# Patient Record
Sex: FEMALE | Race: WHITE | NOT HISPANIC OR LATINO | URBAN - METROPOLITAN AREA
[De-identification: names, ages, dates, MRNs, and addresses within clinical notes are randomized per-mention and may not be internally consistent; named-entity substitution may affect disease eponyms.]

---

## 2023-03-17 PROBLEM — J34.2 DEVIATED NASAL SEPTUM: Status: ACTIVE | Noted: 2023-03-17

## 2023-03-17 PROBLEM — R31.9 HEMATURIA: Status: ACTIVE | Noted: 2023-03-17

## 2023-03-17 PROBLEM — M79.671 RIGHT FOOT PAIN: Status: ACTIVE | Noted: 2023-03-17

## 2023-03-17 PROBLEM — E55.9 VITAMIN D DEFICIENCY: Status: ACTIVE | Noted: 2023-03-17

## 2023-03-17 PROBLEM — R35.0 URINARY FREQUENCY: Status: ACTIVE | Noted: 2023-03-17

## 2023-03-17 PROBLEM — E11.9 DIABETES MELLITUS, TYPE 2 (MULTI): Status: ACTIVE | Noted: 2023-03-17

## 2023-03-17 PROBLEM — R19.7 DIARRHEA: Status: RESOLVED | Noted: 2023-03-17 | Resolved: 2023-03-17

## 2023-03-17 PROBLEM — H92.01 OTALGIA OF RIGHT EAR: Status: ACTIVE | Noted: 2023-03-17

## 2023-03-17 PROBLEM — L29.9 PRURITUS: Status: ACTIVE | Noted: 2023-03-17

## 2023-03-17 PROBLEM — G89.29 CHRONIC NECK PAIN: Status: ACTIVE | Noted: 2023-03-17

## 2023-03-17 PROBLEM — R60.0 EDEMA OF BOTH LEGS: Status: ACTIVE | Noted: 2023-03-17

## 2023-03-17 PROBLEM — M06.9 RHEUMATOID ARTHRITIS (MULTI): Status: ACTIVE | Noted: 2023-03-17

## 2023-03-17 PROBLEM — B37.31 VAGINITIS DUE TO CANDIDA ALBICANS: Status: RESOLVED | Noted: 2023-03-17 | Resolved: 2023-03-17

## 2023-03-17 PROBLEM — I49.1 PAC (PREMATURE ATRIAL CONTRACTION): Status: ACTIVE | Noted: 2023-03-17

## 2023-03-17 PROBLEM — M25.561 RIGHT KNEE PAIN: Status: ACTIVE | Noted: 2023-03-17

## 2023-03-17 PROBLEM — R10.9 LEFT FLANK PAIN: Status: ACTIVE | Noted: 2023-03-17

## 2023-03-17 PROBLEM — B02.9 SHINGLES RASH: Status: ACTIVE | Noted: 2023-03-17

## 2023-03-17 PROBLEM — L29.9 ITCHY SKIN: Status: ACTIVE | Noted: 2023-03-17

## 2023-03-17 PROBLEM — R73.9 HYPERGLYCEMIA: Status: ACTIVE | Noted: 2023-03-17

## 2023-03-17 PROBLEM — R43.0 LOSS OF SMELL: Status: RESOLVED | Noted: 2023-03-17 | Resolved: 2023-03-17

## 2023-03-17 PROBLEM — B37.31 VAGINITIS DUE TO CANDIDA ALBICANS: Status: ACTIVE | Noted: 2023-03-17

## 2023-03-17 PROBLEM — K58.9 IBS (IRRITABLE BOWEL SYNDROME): Status: ACTIVE | Noted: 2023-03-17

## 2023-03-17 PROBLEM — R10.9 RIGHT FLANK PAIN: Status: ACTIVE | Noted: 2023-03-17

## 2023-03-17 PROBLEM — S61.209A OPEN WOUND OF FINGER: Status: ACTIVE | Noted: 2023-03-17

## 2023-03-17 PROBLEM — G47.00 INSOMNIA: Status: ACTIVE | Noted: 2023-03-17

## 2023-03-17 PROBLEM — R31.9 HEMATURIA: Status: RESOLVED | Noted: 2023-03-17 | Resolved: 2023-03-17

## 2023-03-17 PROBLEM — M54.2 CHRONIC NECK PAIN: Status: ACTIVE | Noted: 2023-03-17

## 2023-03-17 PROBLEM — R44.8 FACIAL PRESSURE: Status: ACTIVE | Noted: 2023-03-17

## 2023-03-17 PROBLEM — K64.9 HEMORRHOIDS: Status: ACTIVE | Noted: 2023-03-17

## 2023-03-17 PROBLEM — R63.5 WEIGHT GAIN: Status: RESOLVED | Noted: 2023-03-17 | Resolved: 2023-03-17

## 2023-03-17 PROBLEM — L03.112 CELLULITIS OF AXILLA, LEFT: Status: ACTIVE | Noted: 2023-03-17

## 2023-03-17 PROBLEM — I10 BENIGN ESSENTIAL HYPERTENSION: Status: ACTIVE | Noted: 2023-03-17

## 2023-03-17 PROBLEM — E66.01 MORBID OBESITY WITH BMI OF 45.0-49.9, ADULT (MULTI): Status: ACTIVE | Noted: 2023-03-17

## 2023-03-17 PROBLEM — H91.93 BILATERAL HEARING LOSS: Status: ACTIVE | Noted: 2023-03-17

## 2023-03-17 PROBLEM — G43.909 MIGRAINE HEADACHE: Status: ACTIVE | Noted: 2023-03-17

## 2023-03-17 PROBLEM — E66.01 MORBID OBESITY WITH BMI OF 50.0-59.9, ADULT (MULTI): Status: ACTIVE | Noted: 2023-03-17

## 2023-03-17 PROBLEM — R60.9 EDEMA: Status: RESOLVED | Noted: 2023-03-17 | Resolved: 2023-03-17

## 2023-03-17 PROBLEM — R10.9 RIGHT FLANK PAIN: Status: RESOLVED | Noted: 2023-03-17 | Resolved: 2023-03-17

## 2023-03-17 PROBLEM — R94.31 ABNORMAL EKG: Status: ACTIVE | Noted: 2023-03-17

## 2023-03-17 PROBLEM — R43.8 DECREASED SENSE OF SMELL: Status: ACTIVE | Noted: 2023-03-17

## 2023-03-17 PROBLEM — R03.0 ELEVATED BLOOD PRESSURE READING: Status: ACTIVE | Noted: 2023-03-17

## 2023-03-17 PROBLEM — M54.50 LOW BACK PAIN: Status: ACTIVE | Noted: 2023-03-17

## 2023-03-17 PROBLEM — R00.0 SINUS TACHYCARDIA: Status: ACTIVE | Noted: 2023-03-17

## 2023-03-17 PROBLEM — R35.0 URINARY FREQUENCY: Status: RESOLVED | Noted: 2023-03-17 | Resolved: 2023-03-17

## 2023-03-17 PROBLEM — H92.01 OTALGIA OF RIGHT EAR: Status: RESOLVED | Noted: 2023-03-17 | Resolved: 2023-03-17

## 2023-03-17 PROBLEM — L01.00 IMPETIGO: Status: ACTIVE | Noted: 2023-03-17

## 2023-03-17 PROBLEM — R60.9 EDEMA: Status: ACTIVE | Noted: 2023-03-17

## 2023-03-17 PROBLEM — F41.9 ANXIETY: Status: ACTIVE | Noted: 2023-03-17

## 2023-03-17 PROBLEM — R09.82 POSTNASAL DRIP: Status: ACTIVE | Noted: 2023-03-17

## 2023-03-17 PROBLEM — R43.2 LOSS OF TASTE: Status: ACTIVE | Noted: 2023-03-17

## 2023-03-17 PROBLEM — R19.7 DIARRHEA: Status: ACTIVE | Noted: 2023-03-17

## 2023-03-17 PROBLEM — R30.0 DYSURIA: Status: ACTIVE | Noted: 2023-03-17

## 2023-03-17 PROBLEM — N20.1 URETERAL CALCULUS, LEFT: Status: ACTIVE | Noted: 2023-03-17

## 2023-03-17 PROBLEM — E78.5 HYPERLIPIDEMIA: Status: ACTIVE | Noted: 2023-03-17

## 2023-03-17 PROBLEM — M45.9 ANKYLOSING SPONDYLITIS (MULTI): Status: ACTIVE | Noted: 2023-03-17

## 2023-03-17 PROBLEM — M25.551 RIGHT HIP PAIN: Status: ACTIVE | Noted: 2023-03-17

## 2023-03-17 PROBLEM — R43.0 LOSS OF SMELL: Status: ACTIVE | Noted: 2023-03-17

## 2023-03-17 PROBLEM — R63.5 WEIGHT GAIN: Status: ACTIVE | Noted: 2023-03-17

## 2023-03-17 PROBLEM — L81.9 PIGMENTED SKIN LESION: Status: ACTIVE | Noted: 2023-03-17

## 2023-03-17 PROBLEM — E78.00 HYPERCHOLESTEROLEMIA: Status: ACTIVE | Noted: 2023-03-17

## 2023-03-17 PROBLEM — R79.89 ELEVATED TROPONIN LEVEL NOT DUE MYOCARDIAL INFARCTION: Status: ACTIVE | Noted: 2023-03-17

## 2023-03-17 PROBLEM — K21.9 ESOPHAGEAL REFLUX: Status: ACTIVE | Noted: 2023-03-17

## 2023-03-17 PROBLEM — M54.6 THORACIC BACK PAIN: Status: ACTIVE | Noted: 2023-03-17

## 2023-03-17 PROBLEM — R44.8 FACIAL PRESSURE: Status: RESOLVED | Noted: 2023-03-17 | Resolved: 2023-03-17

## 2023-03-17 PROBLEM — S61.209A OPEN WOUND OF FINGER: Status: RESOLVED | Noted: 2023-03-17 | Resolved: 2023-03-17

## 2023-03-17 RX ORDER — OMEPRAZOLE 40 MG/1
CAPSULE, DELAYED RELEASE ORAL
COMMUNITY
End: 2023-03-21 | Stop reason: SDUPTHER

## 2023-03-17 RX ORDER — MULTIVITAMIN
TABLET ORAL
COMMUNITY

## 2023-03-17 RX ORDER — FUROSEMIDE 40 MG/1
40 TABLET ORAL DAILY
COMMUNITY
End: 2023-08-22 | Stop reason: SDUPTHER

## 2023-03-17 RX ORDER — CELECOXIB 200 MG/1
1 CAPSULE ORAL 2 TIMES DAILY
COMMUNITY

## 2023-03-17 RX ORDER — DICYCLOMINE HYDROCHLORIDE 10 MG/1
1 CAPSULE ORAL 2 TIMES DAILY
COMMUNITY
End: 2023-03-21 | Stop reason: SDUPTHER

## 2023-03-17 RX ORDER — FOLIC ACID 1 MG/1
TABLET ORAL
COMMUNITY

## 2023-03-21 ENCOUNTER — OFFICE VISIT (OUTPATIENT)
Dept: PRIMARY CARE | Facility: CLINIC | Age: 58
End: 2023-03-21
Payer: COMMERCIAL

## 2023-03-21 ENCOUNTER — LAB (OUTPATIENT)
Dept: LAB | Facility: LAB | Age: 58
End: 2023-03-21
Payer: COMMERCIAL

## 2023-03-21 VITALS
WEIGHT: 256 LBS | HEART RATE: 89 BPM | TEMPERATURE: 98 F | SYSTOLIC BLOOD PRESSURE: 139 MMHG | RESPIRATION RATE: 20 BRPM | OXYGEN SATURATION: 95 % | DIASTOLIC BLOOD PRESSURE: 42 MMHG | BODY MASS INDEX: 48.37 KG/M2

## 2023-03-21 DIAGNOSIS — R25.2 BILATERAL LEG CRAMPS: ICD-10-CM

## 2023-03-21 DIAGNOSIS — E55.9 VITAMIN D DEFICIENCY: ICD-10-CM

## 2023-03-21 DIAGNOSIS — K58.9 IRRITABLE BOWEL SYNDROME, UNSPECIFIED TYPE: Primary | ICD-10-CM

## 2023-03-21 DIAGNOSIS — K21.9 GASTROESOPHAGEAL REFLUX DISEASE WITHOUT ESOPHAGITIS: ICD-10-CM

## 2023-03-21 DIAGNOSIS — E53.8 VITAMIN B12 DEFICIENCY: ICD-10-CM

## 2023-03-21 DIAGNOSIS — Z83.3 FAMILY HISTORY OF DIABETES MELLITUS (DM): ICD-10-CM

## 2023-03-21 DIAGNOSIS — U07.1 COVID-19 VIRUS INFECTION: ICD-10-CM

## 2023-03-21 DIAGNOSIS — E66.01 MORBID OBESITY WITH BMI OF 45.0-49.9, ADULT (MULTI): ICD-10-CM

## 2023-03-21 DIAGNOSIS — K58.9 IRRITABLE BOWEL SYNDROME, UNSPECIFIED TYPE: ICD-10-CM

## 2023-03-21 LAB
ALANINE AMINOTRANSFERASE (SGPT) (U/L) IN SER/PLAS: 18 U/L (ref 7–45)
ALBUMIN (G/DL) IN SER/PLAS: 3.8 G/DL (ref 3.4–5)
ALKALINE PHOSPHATASE (U/L) IN SER/PLAS: 67 U/L (ref 33–110)
ANION GAP IN SER/PLAS: 13 MMOL/L (ref 10–20)
ASPARTATE AMINOTRANSFERASE (SGOT) (U/L) IN SER/PLAS: 16 U/L (ref 9–39)
BILIRUBIN TOTAL (MG/DL) IN SER/PLAS: 0.3 MG/DL (ref 0–1.2)
CALCIDIOL (25 OH VITAMIN D3) (NG/ML) IN SER/PLAS: 13 NG/ML
CALCIUM (MG/DL) IN SER/PLAS: 9.3 MG/DL (ref 8.6–10.3)
CARBON DIOXIDE, TOTAL (MMOL/L) IN SER/PLAS: 30 MMOL/L (ref 21–32)
CHLORIDE (MMOL/L) IN SER/PLAS: 100 MMOL/L (ref 98–107)
COBALAMIN (VITAMIN B12) (PG/ML) IN SER/PLAS: 654 PG/ML (ref 211–911)
CREATININE (MG/DL) IN SER/PLAS: 0.62 MG/DL (ref 0.5–1.05)
GFR FEMALE: >90 ML/MIN/1.73M2
GLUCOSE (MG/DL) IN SER/PLAS: 129 MG/DL (ref 74–99)
POTASSIUM (MMOL/L) IN SER/PLAS: 4.3 MMOL/L (ref 3.5–5.3)
PROTEIN TOTAL: 6.6 G/DL (ref 6.4–8.2)
SODIUM (MMOL/L) IN SER/PLAS: 139 MMOL/L (ref 136–145)
THYROTROPIN (MIU/L) IN SER/PLAS BY DETECTION LIMIT <= 0.05 MIU/L: 1.15 MIU/L (ref 0.44–3.98)
UREA NITROGEN (MG/DL) IN SER/PLAS: 18 MG/DL (ref 6–23)

## 2023-03-21 PROCEDURE — 82607 VITAMIN B-12: CPT

## 2023-03-21 PROCEDURE — 99214 OFFICE O/P EST MOD 30 MIN: CPT | Performed by: FAMILY MEDICINE

## 2023-03-21 PROCEDURE — 82306 VITAMIN D 25 HYDROXY: CPT

## 2023-03-21 PROCEDURE — 83036 HEMOGLOBIN GLYCOSYLATED A1C: CPT

## 2023-03-21 PROCEDURE — 80053 COMPREHEN METABOLIC PANEL: CPT

## 2023-03-21 PROCEDURE — 36415 COLL VENOUS BLD VENIPUNCTURE: CPT

## 2023-03-21 PROCEDURE — 84443 ASSAY THYROID STIM HORMONE: CPT

## 2023-03-21 RX ORDER — DICYCLOMINE HYDROCHLORIDE 10 MG/1
10 CAPSULE ORAL 2 TIMES DAILY
Qty: 180 CAPSULE | Refills: 1 | Status: SHIPPED | OUTPATIENT
Start: 2023-03-21 | End: 2023-09-17

## 2023-03-21 RX ORDER — OMEPRAZOLE 40 MG/1
40 CAPSULE, DELAYED RELEASE ORAL DAILY
Qty: 90 CAPSULE | Refills: 1 | Status: SHIPPED | OUTPATIENT
Start: 2023-03-21 | End: 2023-09-17

## 2023-03-21 NOTE — PROGRESS NOTES
Subjective   Patient ID: Bren Carson is a 58 y.o. female who presents for COVID FU, Irritable Bowel Syndrome (Needing dycyclomine or something else.), Skin Problem (Has been using a steroid cream for inflamed rash on her back and legs. Trying to determine if it is cellulitis or water. ), and Med Refill.    HPI   Patient comes in today for follow-up on COVID infection and IBS.  She was diagnosed with COVID last Tuesday and had a virtual visit with her insurance and was given Paxlovid.  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    Her IBS has been acting up.  She has been having loose bowel movements about 3 times a day.  She states she has run out of her dicyclomine which has been working pretty well for her.  She has been taking 10 mg twice a day.    Patient also complaining about leg cramps.  She states he gets them especially at night.  She does not want to take muscle relaxers for them.  She wants to know if she should take potassium.  I recommended we recheck her potassium level today.      Review of Systems   All other systems reviewed and are negative.      Objective   BP (!) 139/42   Pulse 89   Temp 36.7 °C (98 °F)   Resp 20   Wt 116 kg (256 lb)   LMP  (LMP Unknown)   SpO2 95%   BMI 48.37 kg/m²     Physical Exam  Vitals reviewed.   Constitutional:       Appearance: She is well-developed. She is obese.      Comments: Morbidly obese  female   HENT:      Head: Normocephalic and atraumatic.      Right Ear: Tympanic membrane, ear canal and external ear normal.      Left Ear: Tympanic membrane, ear canal and external ear normal.      Nose: Nose normal.      Mouth/Throat:      Mouth: Mucous membranes are moist.      Pharynx: Oropharynx is clear.   Eyes:      Extraocular Movements: Extraocular movements intact.      Conjunctiva/sclera: Conjunctivae normal.      Pupils: Pupils are equal, round, and reactive to light.   Cardiovascular:      Rate and Rhythm: Normal rate and regular rhythm.       Heart sounds: Normal heart sounds. No murmur heard.  Pulmonary:      Effort: Pulmonary effort is normal.      Breath sounds: Normal breath sounds. No wheezing.   Abdominal:      General: Abdomen is flat. Bowel sounds are normal.      Palpations: Abdomen is soft.   Musculoskeletal:         General: Normal range of motion.   Skin:     General: Skin is warm and dry.   Neurological:      General: No focal deficit present.      Mental Status: She is alert and oriented to person, place, and time. Mental status is at baseline.   Psychiatric:         Mood and Affect: Mood normal.         Behavior: Behavior normal.         Thought Content: Thought content normal.         Judgment: Judgment normal.         Assessment/Plan   Problem List Items Addressed This Visit       Esophageal reflux    Relevant Medications    dicyclomine (Bentyl) 10 mg capsule    IBS (irritable bowel syndrome) - Primary    Relevant Medications    omeprazole (PriLOSEC) 40 mg DR capsule    Other Relevant Orders    Comprehensive Metabolic Panel (Completed)    TSH with reflex to Free T4 if abnormal (Completed)    Vitamin D deficiency    Relevant Orders    Vitamin D, Total (Completed)    Morbid obesity with BMI of 45.0-49.9, adult (CMS/[facility])    COVID-19 virus infection    Family history of diabetes mellitus (DM)    Relevant Orders    Hemoglobin A1C (Completed)    Vitamin B12 deficiency    Relevant Orders    Vitamin B12 (Completed)    Bilateral leg cramps    Relevant Orders    Comprehensive Metabolic Panel (Completed)    TSH with reflex to Free T4 if abnormal (Completed)   Will contact patient with lab results when available.  Continue current meds as directed.  Follow up in 6 months for recheck if all remains stable, sooner if problems arise.  Medication list reconciled.  Thank you for visiting today!      Professional services: Some of this note was completed using Dragon voice technology and sometimes the software misinterprets words. This may include  unintended errors with respect to translation of words, typographical errors or grammar errors which may not have been identified prior to finalization of the chart note. Please take this into account when reading the note. Thank you.

## 2023-03-22 PROBLEM — R25.2 BILATERAL LEG CRAMPS: Status: ACTIVE | Noted: 2023-03-22

## 2023-03-22 PROBLEM — U07.1 COVID-19 VIRUS INFECTION: Status: ACTIVE | Noted: 2023-03-22

## 2023-03-22 PROBLEM — E53.8 VITAMIN B12 DEFICIENCY: Status: ACTIVE | Noted: 2023-03-22

## 2023-03-22 PROBLEM — Z83.3 FAMILY HISTORY OF DIABETES MELLITUS (DM): Status: ACTIVE | Noted: 2023-03-22

## 2023-03-22 LAB
ESTIMATED AVERAGE GLUCOSE FOR HBA1C: 151 MG/DL
HEMOGLOBIN A1C/HEMOGLOBIN TOTAL IN BLOOD: 6.9 %

## 2023-03-24 ENCOUNTER — TELEPHONE (OUTPATIENT)
Dept: PRIMARY CARE | Facility: CLINIC | Age: 58
End: 2023-03-24
Payer: COMMERCIAL

## 2023-03-24 DIAGNOSIS — E55.9 VITAMIN D DEFICIENCY: Primary | ICD-10-CM

## 2023-03-24 RX ORDER — ERGOCALCIFEROL 1.25 MG/1
CAPSULE ORAL
Qty: 16 CAPSULE | Refills: 2 | Status: SHIPPED | OUTPATIENT
Start: 2023-03-24 | End: 2023-04-17

## 2023-03-24 NOTE — TELEPHONE ENCOUNTER
----- Message from Octavio Garcia DO sent at 3/24/2023  7:32 AM EDT -----  Regarding: Labs  Please notify patient of hemoglobin A1c of 6.9.  Watch sugar, carbs and starches in the diet and recheck in July 2023.    Very low vitamin D of 13.  It should be between 30 and 100 the closer to 50 the better. It is important vitamin for your heart, lungs, immune system and bones among other things in your body. Would recommend high potency vitamin D 50,000 units 4 times a week for the next 12 weeks and recheck in July 2023.  Prescription sent to pharmacy.    All the rest of the labs are good.  No reason for leg cramps as potassium and calcium levels are good.  ----- Message -----  From: Lab, Background User  Sent: 3/21/2023   8:23 PM EDT  To: Octavio Garcia DO

## 2023-04-15 DIAGNOSIS — E55.9 VITAMIN D DEFICIENCY: ICD-10-CM

## 2023-04-17 RX ORDER — ERGOCALCIFEROL 1.25 MG/1
CAPSULE ORAL
Qty: 16 CAPSULE | Refills: 2 | Status: SHIPPED | OUTPATIENT
Start: 2023-04-17 | End: 2024-01-22

## 2023-04-17 NOTE — TELEPHONE ENCOUNTER
Requested Prescriptions     Pending Prescriptions Disp Refills    ergocalciferol (Vitamin D-2) 1.25 MG (13634 UT) capsule [Pharmacy Med Name: VITAMIN D2 1.25MG(50,000 UNIT)] 16 capsule 2     Sig: TAKE 1 CAPSULE BY MOUTH 4X/WEEK FOR 12 WEEKS

## 2023-05-15 ENCOUNTER — OFFICE VISIT (OUTPATIENT)
Dept: PRIMARY CARE | Facility: CLINIC | Age: 58
End: 2023-05-15
Payer: COMMERCIAL

## 2023-05-15 VITALS
OXYGEN SATURATION: 94 % | SYSTOLIC BLOOD PRESSURE: 138 MMHG | RESPIRATION RATE: 18 BRPM | DIASTOLIC BLOOD PRESSURE: 75 MMHG | TEMPERATURE: 97.2 F | HEART RATE: 84 BPM

## 2023-05-15 DIAGNOSIS — J20.9 ACUTE BRONCHITIS, UNSPECIFIED ORGANISM: Primary | ICD-10-CM

## 2023-05-15 DIAGNOSIS — E66.01 MORBID OBESITY WITH BMI OF 45.0-49.9, ADULT (MULTI): ICD-10-CM

## 2023-05-15 PROCEDURE — 99213 OFFICE O/P EST LOW 20 MIN: CPT | Performed by: FAMILY MEDICINE

## 2023-05-15 RX ORDER — DOXYCYCLINE 100 MG/1
100 CAPSULE ORAL 2 TIMES DAILY
Qty: 20 CAPSULE | Refills: 0 | Status: SHIPPED | OUTPATIENT
Start: 2023-05-15

## 2023-05-15 RX ORDER — BENZONATATE 200 MG/1
200 CAPSULE ORAL 3 TIMES DAILY PRN
Qty: 30 CAPSULE | Refills: 0 | Status: SHIPPED | OUTPATIENT
Start: 2023-05-15 | End: 2023-05-25

## 2023-05-15 NOTE — LETTER
May 15, 2023     Patient: Bren Carson   YOB: 1965   Date of Visit: 5/15/2023       To Whom It May Concern:    Bren Carson was seen in my clinic on 5/15/2023 at 9:30 am. Please excuse Bren for her absence from work on this day to make the appointment.    If you have any questions or concerns, please don't hesitate to call.         Sincerely,         Octavio Garcia,         CC: No Recipients

## 2023-05-15 NOTE — PROGRESS NOTES
Subjective   Patient ID: Bren Carson is a 58 y.o. female who presents for Cough (Sore throat, productive- grayish. ), Nasal Congestion (Started wednesday), and Chills (Last took tylenol around 430 am).    HPI   Patient comes in today for evaluation of upper respiratory symptoms.  She started last Tuesday with a sore throat and on Wednesday developed congestion.  It gradually got worse over the next several days and through the weekend.  She has been running a low-grade temperature and her arthritis has been flaring up.  She has had a productive cough with gray sputum and nasal drainage which has been green in color.  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    3/21/2023  Patient comes in today for follow-up on COVID infection and IBS.  She was diagnosed with COVID last Tuesday and had a virtual visit with her insurance and was given Paxlovid.  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    Her IBS has been acting up.  She has been having loose bowel movements about 3 times a day.  She states she has run out of her dicyclomine which has been working pretty well for her.  She has been taking 10 mg twice a day.    Patient also complaining about leg cramps.  She states he gets them especially at night.  She does not want to take muscle relaxers for them.  She wants to know if she should take potassium.  I recommended we recheck her potassium level today.        2/6/2023  Patient comes in today complaining of neck and back pain which started last monday, did not have an injury. She states that she woke up with the pain. She states she has been out of Celebrex for 3 or 4 days but now is back on it and her discomfort is getting better. She describes the pain today as a 4 out of 10. She does see rheumatology, Dr. Quiñones, and he is supposed to be starting her on a trial of new medication. She has an appointment later this month. Nothing however seems to be helping much.    11/30/2022  Patient  comes in today for follow-up on the edema of both of her lower extremities. She has been through a lot in the last few weeks. Her mother  on Saturday, 2022. She was experiencing some leg edema and had a visit with a telemetry doc who thought she may have cellulitis and placed her on Keflex. The Keflex did not help the edema.    She then came in for an office visit here and was evaluated and thought to be in atrial fibrillation. Because it was new onset she was sent to the ER via squad. After thorough evaluation in the ER she was released to home with no recommended treatment for the edema. She had a follow-up appointment with cardiology who apparently questioned her on why she did not start taking Lasix which she had at home. Patient claims she has 3 different medical personnel in the ER about taking the Lasix and they all instructed her to wait until she was seen here. As of today she has been taking Lasix for the last 2 days. The swelling is starting to go down and she has lost 3 pounds.    Patient also complaining of body itching she claims that it is all over her body. She has no visible rash.    2022   Patient comes in today for evaluation of moles on her back. Patient has a number of pigmented nevi of various sizes all over her back. There are none that appear to be worrisome at this time. I have recommended that we continue to follow and recheck in 6 months. Patient continues to take his dicyclomine for her bowels but has cut it down to 2 pills a day rather than 4.    Patient realizes that she needs to do something about her weight. 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    11/3/2021  Patient is seen today as a telemedicine visit rendered via realtime interactive audio/video [employer] as we are currently in the middle of a coronavirus pandemic worldwide. The patient was informed about the telehealth clinical encounter including benefits to avoiding  "travel and limitations to the assessment. In person care may be recommended if needed. Telehealth sessions are not being recorded and personal health information is protected.    Patient presents today with upper respiratory symptoms. She has sinus congestion and drainage, left ear pain, cough and some chest congestion as well. She denies fever or sore throat. Patient is a teacher. She denies any Covid in her classroom. She has had the Covid vaccine but not the booster.    9/21/2021  Patient comes in today complaining of back pain. It starts beneath her left scapula and comes down her back and curls around to the front of the abdomen. She states that it began a little on Sunday and more so yesterday on Monday. She states she was doing prep work on Saturday for a big picnic on Sunday. It required a lot of repetitive work but not heavy lifting. Patient states she is not really had any IBS symptoms the last few days and denies blood or mucus in the stool. She did have some shrimp and a few peanuts over the weekend but otherwise nothing unusual to eat. She denies blood in her urine.    2/9/2021  Patient comes in today for reevaluation of her low back pain. She has been here before for this. 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 As noted previously she is having to teach both at the same time. She finds her self sitting in the chair in the classroom and twisting and turning her body in different directions so she can see both the computer screen and the classroom. She is morbidly obese and has gained more weight and is up to 268 pounds now. She has gained 6 more pounds since October. She states that she gets \"knots\" in her back and she has used dry needling in the past and it really seems to work. She would like to try it again. The pain does not go down her legs but it does limit her range of motion.    Patient is also overdue for labs and bone density study. She has " "never had a bone density before and is approximately 4 years postmenopausal.    Being a teacher patient is scheduled to get the coronavirus vaccine next week and has been told by her rheumatologist, Dr. Quiñones to get off of the methotrexate for 2 weeks following the vaccine. She is miserable when she does not take the methotrexate and wonders what she can do. Unfortunately with the other medicines she is on all she can do is add Tylenol 500 mg 2 pills p.o. 3 times daily as needed. She states Tylenol does not really work for her.    10/30/2020  Patient comes in today for physical exam. She is currently without complaints. Her recent lab work shows her lipid panel and cholesterol to be high, her hemoglobin A1c was 6.7 and vitamin D was low. She will work on correcting all those. She knows she needs to lose weight. It is very stressful for her right now as her  has lost his job and is looking for work and she is a teacher and is trying to manage virtual and in person classes.     10/8/2020  Patient comes in today for evaluation of low back pain. 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 She is having to teach both at the same time. She finds herself sitting in the chair in the classroom and last Thursday when she got up she could hardly walk. Patient is morbidly obese at 5 foot 1, 261 pounds.    4/29/2020  Patient is seen today as a telemedicine visit rendered via realtime interactive audio/video [email or web address] services as we are currently in the middle of a coronavirus pandemic worldwide with stay-at-home orders by the government. Bren presents today describing \"major pain in my lower back\". She has had this before with bladder infections most recently back in February 2020. She believes she has an infection now today. She has gained some weight during this stay at home time for the coronavirus pandemic but doesn't feeling that has anything to do with this pain she is " "having. He is also having frequency and urgency with urination.    2/5/2020  Patient comes in today complaining of right lower back pain. It all began last night with sharp pain in the right flank and lower back. She denies trauma or injury to the back. She is morbidly obese. UA today is positive for trace amount of blood.    11/27/19  Patient comes in today with her daughter who is also sick complaining of swelling of her right eye which began acutely yesterday. It became worse last evening and through the night and this morning it's now spread to the left eye. She has also a sore throat, sinus congestion and drainage and left ear pain.    2/11/2019   The patient is a 53 year old female who presents for a medication evaluation. The patient feels well with minor complaints (Pt is here for a follow up on her meds she also complains of a possible cyst on the right side of her neck). Note for \"Medication Evaluation\": Patient comes in today to review a variety of issues.  She first was here for complaints of some swelling on the right side of her neck.  Patient is morbidly obese.  She noticed the swelling in the past week.    For her irritable bowel syndrome patient has been using dicyclomine 2 times a day successfully until now.  Lately however she's been having some breakthrough issues and would like to increase it to 3 times a day.  It was originally prescribed 4 times a day    Patient continues to notice the shaking in her left fifth finger which appears to be a resting tremor.  She is left-hand dominant.  She has also noticed that she's been getting more forgetful and it appears to be more short-term memory than long-term memory.  She also has had a loss of smell and is on her second ENT specialist for evaluation of that.  So far they have not come up with a solution.  He does have her scheduled for an MRI coming up.  She has an appointment with neurology but not until next month.    11/27/17  Patient comes in " today complaining of low back pain. She claims the pain travels into her right hip. She has also had some pain in her neck especially with left side bending. There is no known trauma for either location. She has had the low back pain before but hasn't had an x-ray in quite some time.    She also continues to complain that she can't taste or smell anything. She has seen a specialist about it but they did not offer any help.     Patient is a schoolteacher.     She had also noticed that she has to have a bowel movement within an hour after eating. That is much improved now with the probiotics.     Review of Systems   All other systems reviewed and are negative.      Objective   /75   Pulse 84   Temp 36.2 °C (97.2 °F)   Resp 18   LMP  (LMP Unknown)   SpO2 94%     Physical Exam  Vitals reviewed.   Constitutional:       Appearance: She is well-developed.      Comments: Morbidly obese 58-year-old  female   HENT:      Head: Normocephalic and atraumatic.      Right Ear: Tympanic membrane, ear canal and external ear normal.      Left Ear: Tympanic membrane, ear canal and external ear normal.      Nose: Nose normal.      Mouth/Throat:      Mouth: Mucous membranes are moist.      Pharynx: Oropharynx is clear.   Eyes:      Extraocular Movements: Extraocular movements intact.      Conjunctiva/sclera: Conjunctivae normal.      Pupils: Pupils are equal, round, and reactive to light.   Cardiovascular:      Rate and Rhythm: Normal rate and regular rhythm.      Heart sounds: Normal heart sounds. No murmur heard.  Pulmonary:      Effort: Pulmonary effort is normal.      Breath sounds: Normal breath sounds. No wheezing.   Abdominal:      General: Abdomen is flat. Bowel sounds are normal.      Palpations: Abdomen is soft.   Musculoskeletal:         General: Normal range of motion.   Skin:     General: Skin is warm and dry.   Neurological:      General: No focal deficit present.      Mental Status: She is alert and  oriented to person, place, and time. Mental status is at baseline.   Psychiatric:         Mood and Affect: Mood normal.         Behavior: Behavior normal.         Thought Content: Thought content normal.         Judgment: Judgment normal.         Assessment/Plan   Problem List Items Addressed This Visit       Morbid obesity with BMI of 45.0-49.9, adult (CMS/HCC)    Acute bronchitis - Primary    Relevant Medications    doxycycline (Monodox) 100 mg capsule    benzonatate (Tessalon) 200 mg capsule   Use meds as directed.  Return to clinic if symptoms do not improve in 10-14 days.    Should the condition worsen contact me and return here or if after hours seek further evaluation at an urgent care or in the emergency room.  How of the work slip until Wednesday, 5/17/2023.  Medication list reconciled.  Thank you for visiting today!      Professional services: Some of this note was completed using Dragon voice technology and sometimes the software misinterprets words. This may include unintended errors with respect to translation of words, typographical errors or grammar errors which may not have been identified prior to finalization of the chart note. Please take this into account when reading the note. Thank you.

## 2023-08-22 ENCOUNTER — OFFICE VISIT (OUTPATIENT)
Dept: PRIMARY CARE | Facility: CLINIC | Age: 58
End: 2023-08-22
Payer: COMMERCIAL

## 2023-08-22 VITALS
DIASTOLIC BLOOD PRESSURE: 89 MMHG | WEIGHT: 263 LBS | RESPIRATION RATE: 20 BRPM | HEART RATE: 105 BPM | TEMPERATURE: 97.3 F | SYSTOLIC BLOOD PRESSURE: 123 MMHG | BODY MASS INDEX: 49.69 KG/M2

## 2023-08-22 DIAGNOSIS — R60.0 EDEMA OF BOTH LEGS: ICD-10-CM

## 2023-08-22 DIAGNOSIS — E11.9 TYPE 2 DIABETES MELLITUS WITHOUT COMPLICATION, WITHOUT LONG-TERM CURRENT USE OF INSULIN (MULTI): ICD-10-CM

## 2023-08-22 DIAGNOSIS — M45.0 ANKYLOSING SPONDYLITIS OF MULTIPLE SITES IN SPINE (MULTI): Primary | ICD-10-CM

## 2023-08-22 DIAGNOSIS — E55.9 VITAMIN D DEFICIENCY: ICD-10-CM

## 2023-08-22 DIAGNOSIS — E66.01 MORBID OBESITY WITH BMI OF 45.0-49.9, ADULT (MULTI): ICD-10-CM

## 2023-08-22 PROCEDURE — 99214 OFFICE O/P EST MOD 30 MIN: CPT | Performed by: FAMILY MEDICINE

## 2023-08-22 RX ORDER — FUROSEMIDE 40 MG/1
40 TABLET ORAL DAILY
Qty: 30 TABLET | Refills: 1 | Status: SHIPPED | OUTPATIENT
Start: 2023-08-22 | End: 2024-01-22

## 2023-08-22 RX ORDER — PREDNISONE 20 MG/1
TABLET ORAL
Qty: 30 TABLET | Refills: 0 | Status: SHIPPED | OUTPATIENT
Start: 2023-08-22

## 2023-08-22 RX ORDER — HYDROXYCHLOROQUINE SULFATE 200 MG/1
200 TABLET, FILM COATED ORAL 2 TIMES DAILY
COMMUNITY
Start: 2023-06-20

## 2023-08-22 NOTE — PROGRESS NOTES
Subjective   Patient ID: Bren Carson is a 58 y.o. female who presents for Med Refill (States she hasn't taken the dycylomine in a while. Did start plaquenil in June and not soon after has not felt way. Sob, muscles are taught, can't get comfortable to sleep. ).    HPI  Patient comes in today complaining of significant back pain and muscle aches.  She states she cannot sit or stand or lay down for too long before her back hurts significantly and she has to change positions.  She currently is standing in the exam room leaning over the exam table.  She claims her back and legs hurt.  She has recently changed rheumatologist and is now seeing Dr. Tomas at [facility] who has placed her on Plaquenil 200 mg twice daily along with methotrexate injections.  She is morbidly obese.  She is taking Celebrex 200 mg twice daily as well.  She does not have an appointment with Dr. Tomas until October.  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    Review of Systems   All other systems reviewed and are negative.      Objective   /89   Pulse 105   Temp 36.3 °C (97.3 °F)   Resp 20   Wt 119 kg (263 lb)   LMP  (LMP Unknown)   BMI 49.69 kg/m²     Physical Exam  Vitals reviewed.   Constitutional:       Appearance: She is morbidly obese.      Comments: Morbidly obese 58-year-old  female   HENT:      Head: Normocephalic and atraumatic.      Right Ear: Tympanic membrane, ear canal and external ear normal.      Left Ear: Tympanic membrane, ear canal and external ear normal.      Nose: Nose normal.      Mouth/Throat:      Mouth: Mucous membranes are moist.      Pharynx: Oropharynx is clear.   Eyes:      Extraocular Movements: Extraocular movements intact.      Conjunctiva/sclera: Conjunctivae normal.      Pupils: Pupils are equal, round, and reactive to light.   Cardiovascular:      Rate and Rhythm: Normal rate and regular rhythm.      Heart sounds: Normal heart sounds. No murmur heard.  Pulmonary:       Effort: Pulmonary effort is normal.      Breath sounds: Normal breath sounds. No wheezing.   Abdominal:      General: Abdomen is flat. Bowel sounds are normal.      Palpations: Abdomen is soft.   Musculoskeletal:         General: Normal range of motion.   Skin:     General: Skin is warm and dry.   Neurological:      General: No focal deficit present.      Mental Status: She is alert and oriented to person, place, and time. Mental status is at baseline.   Psychiatric:         Mood and Affect: Mood normal.         Behavior: Behavior normal.         Thought Content: Thought content normal.         Judgment: Judgment normal.         Assessment/Plan   Problem List Items Addressed This Visit       Diabetes mellitus, type 2 (CMS/[facility])    Relevant Orders    Hemoglobin A1C    Comprehensive Metabolic Panel    Edema of both legs    Relevant Medications    furosemide (Lasix) 40 mg tablet    Other Relevant Orders    Comprehensive Metabolic Panel    Vitamin D deficiency    Relevant Orders    Vitamin D, Total    Ankylosing spondylitis (CMS/[facility]) - Primary    Relevant Medications    predniSONE (Deltasone) 20 mg tablet    Morbid obesity with BMI of 45.0-49.9, adult (CMS/[facility])   Will contact patient with lab results when available.  Take new medication as directed.  Continue other medications as directed.  RTC in one month for recheck, sooner should problems arise.  Medication list reconciled.  Thank you for visiting today!      Professional services: Some of this note was completed using Dragon voice technology and sometimes the software misinterprets words. This may include unintended errors with respect to translation of words, typographical errors or grammar errors which may not have been identified prior to finalization of the chart note. Please take this into account when reading the note. Thank you.

## 2023-12-11 ENCOUNTER — LAB (OUTPATIENT)
Dept: LAB | Facility: LAB | Age: 58
End: 2023-12-11
Payer: COMMERCIAL

## 2023-12-11 ENCOUNTER — OFFICE VISIT (OUTPATIENT)
Dept: PRIMARY CARE | Facility: CLINIC | Age: 58
End: 2023-12-11
Payer: COMMERCIAL

## 2023-12-11 VITALS
RESPIRATION RATE: 24 BRPM | HEART RATE: 105 BPM | OXYGEN SATURATION: 93 % | DIASTOLIC BLOOD PRESSURE: 87 MMHG | BODY MASS INDEX: 49.88 KG/M2 | WEIGHT: 264 LBS | SYSTOLIC BLOOD PRESSURE: 133 MMHG | TEMPERATURE: 98 F

## 2023-12-11 DIAGNOSIS — L03.115 CELLULITIS OF RIGHT LOWER EXTREMITY: Primary | ICD-10-CM

## 2023-12-11 DIAGNOSIS — E55.9 VITAMIN D DEFICIENCY: ICD-10-CM

## 2023-12-11 DIAGNOSIS — R60.0 EDEMA OF BOTH LEGS: ICD-10-CM

## 2023-12-11 DIAGNOSIS — E11.9 TYPE 2 DIABETES MELLITUS WITHOUT COMPLICATION, WITHOUT LONG-TERM CURRENT USE OF INSULIN (MULTI): ICD-10-CM

## 2023-12-11 LAB
25(OH)D3 SERPL-MCNC: 44 NG/ML (ref 30–100)
ALBUMIN SERPL BCP-MCNC: 3.9 G/DL (ref 3.4–5)
ALP SERPL-CCNC: 59 U/L (ref 33–110)
ALT SERPL W P-5'-P-CCNC: 22 U/L (ref 7–45)
ANION GAP SERPL CALC-SCNC: 13 MMOL/L (ref 10–20)
AST SERPL W P-5'-P-CCNC: 24 U/L (ref 9–39)
BILIRUB SERPL-MCNC: 1 MG/DL (ref 0–1.2)
BUN SERPL-MCNC: 15 MG/DL (ref 6–23)
CALCIUM SERPL-MCNC: 9 MG/DL (ref 8.6–10.3)
CHLORIDE SERPL-SCNC: 99 MMOL/L (ref 98–107)
CO2 SERPL-SCNC: 36 MMOL/L (ref 21–32)
CREAT SERPL-MCNC: 0.7 MG/DL (ref 0.5–1.05)
EST. AVERAGE GLUCOSE BLD GHB EST-MCNC: 154 MG/DL
GFR SERPL CREATININE-BSD FRML MDRD: >90 ML/MIN/1.73M*2
GLUCOSE SERPL-MCNC: 127 MG/DL (ref 74–99)
HBA1C MFR BLD: 7 %
POTASSIUM SERPL-SCNC: 3.7 MMOL/L (ref 3.5–5.3)
PROT SERPL-MCNC: 6.2 G/DL (ref 6.4–8.2)
SODIUM SERPL-SCNC: 144 MMOL/L (ref 136–145)

## 2023-12-11 PROCEDURE — 82306 VITAMIN D 25 HYDROXY: CPT

## 2023-12-11 PROCEDURE — 80053 COMPREHEN METABOLIC PANEL: CPT

## 2023-12-11 PROCEDURE — 99214 OFFICE O/P EST MOD 30 MIN: CPT | Performed by: FAMILY MEDICINE

## 2023-12-11 PROCEDURE — 36415 COLL VENOUS BLD VENIPUNCTURE: CPT

## 2023-12-11 PROCEDURE — 83036 HEMOGLOBIN GLYCOSYLATED A1C: CPT

## 2023-12-11 RX ORDER — METOLAZONE 5 MG/1
5 TABLET ORAL DAILY
Qty: 10 TABLET | Refills: 0 | Status: SHIPPED | OUTPATIENT
Start: 2023-12-11 | End: 2024-02-26 | Stop reason: ALTCHOICE

## 2023-12-11 RX ORDER — CEPHALEXIN 500 MG/1
500 CAPSULE ORAL 3 TIMES DAILY
Qty: 30 CAPSULE | Refills: 0 | Status: SHIPPED | OUTPATIENT
Start: 2023-12-11 | End: 2023-12-21

## 2023-12-11 NOTE — PROGRESS NOTES
"Subjective   Patient ID: Bren Carson is a 58 y.o. female who presents for Edema (Legs and belly, intermittent x 1 yr. She had been taking the lasix but her legs are not improving, the are painful and swollen and seeping. /Seeing vascular- told her to stay active. Doing vein ablation, 1st was last Thursday on R leg. ).    HPI   Patient comes in today with her  Diego complaining of significant painful edema in her extremities bilaterally right greater than left and in her lower abdomen.  She has been taking Lasix 40 mg daily until last week when she increase it to 80 mg but it does not seem to be helping.  She is very uncomfortable.  She has been getting ablation of her veins in both legs most recently this past Thursday and her next one is due 12/21/2023.  She has been instructed to have her  massage her legs in between ablation treatments but that process has proved to be extremely painful bringing her to tears.  Her legs have been \"leaking fluid\" especially on the right and she has a small pretibial shallow ulceration approximately 5 mm in diameter on the right.    8/22/2023  Patient comes in today complaining of significant back pain and muscle aches.  She states she cannot sit or stand or lay down for too long before her back hurts significantly and she has to change positions.  She currently is standing in the exam room leaning over the exam table.  She claims her back and legs hurt.  She has recently changed rheumatologist and is now seeing Dr. Tomas at [facility] who has placed her on Plaquenil 200 mg twice daily along with methotrexate injections.  She is morbidly obese.  She is taking Celebrex 200 mg twice daily as well.  She does not have an appointment with Dr. Tomas until October.  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    Review of Systems   All other systems reviewed and are negative.      Objective   /87   Pulse 105   Temp 36.7 °C (98 °F)   Resp 24   Wt 120 " kg (264 lb)   LMP  (LMP Unknown)   SpO2 93%   BMI 49.88 kg/m²     Physical Exam  Constitutional:       Appearance: She is well-developed. She is morbidly obese.       HENT:      Head: Normocephalic and atraumatic.      Right Ear: Tympanic membrane, ear canal and external ear normal.      Left Ear: Tympanic membrane, ear canal and external ear normal.      Nose: Nose normal.      Mouth/Throat:      Mouth: Mucous membranes are moist.      Pharynx: Oropharynx is clear.   Eyes:      Extraocular Movements: Extraocular movements intact.      Conjunctiva/sclera: Conjunctivae normal.      Pupils: Pupils are equal, round, and reactive to light.      Comments: Patient wears glasses   Cardiovascular:      Rate and Rhythm: Normal rate and regular rhythm.      Heart sounds: Normal heart sounds. No murmur heard.  Pulmonary:      Effort: Pulmonary effort is normal.      Breath sounds: Normal breath sounds. No wheezing.   Abdominal:      General: Abdomen is flat. Bowel sounds are normal.      Palpations: Abdomen is soft.   Musculoskeletal:         General: Normal range of motion.   Skin:     General: Skin is warm and dry.   Neurological:      General: No focal deficit present.      Mental Status: She is alert and oriented to person, place, and time. Mental status is at baseline.   Psychiatric:         Mood and Affect: Mood normal.         Behavior: Behavior normal.         Thought Content: Thought content normal.         Judgment: Judgment normal.         Assessment/Plan   Problem List Items Addressed This Visit             ICD-10-CM    Edema of both legs R60.0    Relevant Medications    metOLazone (Zaroxolyn) 5 mg tablet     Other Visit Diagnoses         Codes    Cellulitis of right lower extremity    -  Primary L03.115    Relevant Medications    cephalexin (Keflex) 500 mg capsule        Will contact patient with lab results when available.  Decrease Lasix to 40 mg daily.  Take new medications as directed.  Continue current  meds as directed.  Followup in one week for recheck if all remains stable, sooner if problems arise  Medication list reconciled.  Thank you for visiting today!      Professional services: Some of this note was completed using Dragon voice technology and sometimes the software misinterprets words. This may include unintended errors with respect to translation of words, typographical errors or grammar errors which may not have been identified prior to finalization of the chart note. Please take this into account when reading the note. Thank you.

## 2023-12-12 DIAGNOSIS — E87.6 HYPOKALEMIA: Primary | ICD-10-CM

## 2023-12-12 RX ORDER — POTASSIUM CHLORIDE 20 MEQ/1
20 TABLET, EXTENDED RELEASE ORAL DAILY
Qty: 10 TABLET | Refills: 0 | Status: SHIPPED | OUTPATIENT
Start: 2023-12-12 | End: 2023-12-18 | Stop reason: SDUPTHER

## 2023-12-14 ENCOUNTER — TELEPHONE (OUTPATIENT)
Dept: PRIMARY CARE | Facility: CLINIC | Age: 58
End: 2023-12-14
Payer: COMMERCIAL

## 2023-12-14 NOTE — TELEPHONE ENCOUNTER
I spoke with patient this evening and we discussed her leg swelling.  It is improved and she is urinating more with the new diuretic.  Will have her continue current treatment and follow-up next week as planned.  She is to contact me sooner if any problems arise.

## 2023-12-14 NOTE — TELEPHONE ENCOUNTER
Patient called in stating that the leg is discolored more, it has more tears in skin, and still seeping. Any ideas how to handle? Covering it leads to more. Leaving open is probably not better and my pant leg gets wet? Please advise.  Patient can be reached at [phone].      Thank You

## 2023-12-18 ENCOUNTER — OFFICE VISIT (OUTPATIENT)
Dept: PRIMARY CARE | Facility: CLINIC | Age: 58
End: 2023-12-18
Payer: COMMERCIAL

## 2023-12-18 VITALS
HEART RATE: 61 BPM | BODY MASS INDEX: 46.29 KG/M2 | RESPIRATION RATE: 20 BRPM | DIASTOLIC BLOOD PRESSURE: 81 MMHG | OXYGEN SATURATION: 93 % | TEMPERATURE: 97.3 F | WEIGHT: 245 LBS | SYSTOLIC BLOOD PRESSURE: 114 MMHG

## 2023-12-18 DIAGNOSIS — E11.9 TYPE 2 DIABETES MELLITUS WITHOUT COMPLICATION, WITHOUT LONG-TERM CURRENT USE OF INSULIN (MULTI): ICD-10-CM

## 2023-12-18 DIAGNOSIS — E11.9 TYPE 2 DIABETES MELLITUS WITHOUT COMPLICATION, WITHOUT LONG-TERM CURRENT USE OF INSULIN (MULTI): Primary | ICD-10-CM

## 2023-12-18 DIAGNOSIS — L97.911 ULCER OF RIGHT LOWER EXTREMITY, LIMITED TO BREAKDOWN OF SKIN (MULTI): ICD-10-CM

## 2023-12-18 DIAGNOSIS — E87.6 HYPOKALEMIA: ICD-10-CM

## 2023-12-18 DIAGNOSIS — R60.0 EDEMA OF BOTH LEGS: ICD-10-CM

## 2023-12-18 DIAGNOSIS — E66.01 MORBID OBESITY WITH BMI OF 45.0-49.9, ADULT (MULTI): ICD-10-CM

## 2023-12-18 PROCEDURE — 99214 OFFICE O/P EST MOD 30 MIN: CPT | Performed by: FAMILY MEDICINE

## 2023-12-18 RX ORDER — DULAGLUTIDE 0.75 MG/.5ML
0.75 INJECTION, SOLUTION SUBCUTANEOUS
Refills: 11 | OUTPATIENT
Start: 2023-12-18

## 2023-12-18 RX ORDER — POTASSIUM CHLORIDE 20 MEQ/1
20 TABLET, EXTENDED RELEASE ORAL DAILY
Qty: 90 TABLET | Refills: 0 | Status: SHIPPED | OUTPATIENT
Start: 2023-12-18 | End: 2024-04-30

## 2023-12-18 RX ORDER — DULAGLUTIDE 0.75 MG/.5ML
0.75 INJECTION, SOLUTION SUBCUTANEOUS
Qty: 2 ML | Refills: 11 | Status: SHIPPED | OUTPATIENT
Start: 2023-12-18 | End: 2024-01-30 | Stop reason: ALTCHOICE

## 2023-12-18 NOTE — PROGRESS NOTES
"Subjective   Patient ID: Bren Carson is a 58 y.o. female who presents for Follow-up (For leg edema, improved ).    HPI   Patient comes in today for follow-up of her significant edema and her extremities.  Since she was here last week she has lost 19 pounds and is feeling much better.  The wound on her right lower extremity has quit weeping and is drying up and healing and looks much better.  She continues to take the new water pill as well as the potassium daily and is due to complete the metolazone on Wednesday.    Her hemoglobin A1c had gone up from 6.9 to 7.0 now.  We discussed this today and I have recommended that she start Trulicity weekly.  She is willing to do so.    12/11/2023  Patient comes in today with her  Diego complaining of significant painful edema in her extremities bilaterally right greater than left and in her lower abdomen.  She has been taking Lasix 40 mg daily until last week when she increase it to 80 mg but it does not seem to be helping.  She is very uncomfortable.  She has been getting ablation of her veins in both legs most recently this past Thursday and her next one is due 12/21/2023.  She has been instructed to have her  massage her legs in between ablation treatments but that process has proved to be extremely painful bringing her to tears.  Her legs have been \"leaking fluid\" especially on the right and she has a small pretibial shallow ulceration approximately 5 mm in diameter on the right.    Review of Systems   All other systems reviewed and are negative.      Objective   /81   Pulse 61   Temp 36.3 °C (97.3 °F)   Resp 20   Wt 111 kg (245 lb)   LMP  (LMP Unknown)   SpO2 93%   BMI 46.29 kg/m²     Physical Exam  Constitutional:       Appearance: She is well-developed. She is morbidly obese.   HENT:      Head: Normocephalic and atraumatic.      Right Ear: Tympanic membrane, ear canal and external ear normal.      Left Ear: Tympanic membrane, ear canal " and external ear normal.      Nose: Nose normal.      Mouth/Throat:      Mouth: Mucous membranes are moist.      Pharynx: Oropharynx is clear.   Eyes:      Extraocular Movements: Extraocular movements intact.      Conjunctiva/sclera: Conjunctivae normal.      Pupils: Pupils are equal, round, and reactive to light.   Cardiovascular:      Rate and Rhythm: Normal rate and regular rhythm.      Heart sounds: Normal heart sounds. No murmur heard.  Pulmonary:      Effort: Pulmonary effort is normal.      Breath sounds: Normal breath sounds. No wheezing.   Abdominal:      General: Abdomen is flat. Bowel sounds are normal.      Palpations: Abdomen is soft.   Musculoskeletal:         General: Normal range of motion.      Right lower leg: Edema (Trace edema) present.      Left lower leg: Edema (Trace edema) present.   Skin:     General: Skin is warm and dry.      Findings: Lesion (Shallow healing ulceration right lower extremity pretibial region.) present.   Neurological:      General: No focal deficit present.      Mental Status: She is alert and oriented to person, place, and time. Mental status is at baseline.   Psychiatric:         Mood and Affect: Mood normal.         Behavior: Behavior normal.         Thought Content: Thought content normal.         Judgment: Judgment normal.         Assessment/Plan   Problem List Items Addressed This Visit             ICD-10-CM    Diabetes mellitus, type 2 (CMS/[facility]) - Primary E11.9    Relevant Medications    dulaglutide (Trulicity) 0.75 mg/0.5 mL pen injector    Morbid obesity with BMI of 45.0-49.9, adult (CMS/[facility]) E66.01, Z68.42     Other Visit Diagnoses         Codes    Hypokalemia     E87.6    Relevant Medications    potassium chloride CR 20 mEq ER tablet    Ulcer of right lower extremity, limited to breakdown of skin (CMS/[facility])     L97.911        Take new medication as directed.  Continue other medications as directed.  RTC in one month for recheck, sooner should problems  arise.  Continue follow-up with specialist.  Medication list reconciled.  Thank you for visiting today!      Professional services: Some of this note was completed using Dragon voice technology and sometimes the software misinterprets words. This may include unintended errors with respect to translation of words, typographical errors or grammar errors which may not have been identified prior to finalization of the chart note. Please take this into account when reading the note. Thank you.

## 2024-01-15 ENCOUNTER — OFFICE VISIT (OUTPATIENT)
Dept: PRIMARY CARE | Facility: CLINIC | Age: 59
End: 2024-01-15
Payer: COMMERCIAL

## 2024-01-15 VITALS
WEIGHT: 244 LBS | RESPIRATION RATE: 20 BRPM | TEMPERATURE: 97.7 F | DIASTOLIC BLOOD PRESSURE: 80 MMHG | SYSTOLIC BLOOD PRESSURE: 117 MMHG | OXYGEN SATURATION: 92 % | BODY MASS INDEX: 46.1 KG/M2 | HEART RATE: 88 BPM

## 2024-01-15 DIAGNOSIS — E11.9 TYPE 2 DIABETES MELLITUS WITHOUT COMPLICATION, WITHOUT LONG-TERM CURRENT USE OF INSULIN (MULTI): ICD-10-CM

## 2024-01-15 DIAGNOSIS — E66.01 MORBID OBESITY WITH BMI OF 45.0-49.9, ADULT (MULTI): ICD-10-CM

## 2024-01-15 DIAGNOSIS — E55.9 VITAMIN D DEFICIENCY: ICD-10-CM

## 2024-01-15 DIAGNOSIS — I83.899 COMPLICATED VARICOSE VEINS: Primary | ICD-10-CM

## 2024-01-15 PROCEDURE — 99213 OFFICE O/P EST LOW 20 MIN: CPT | Performed by: FAMILY MEDICINE

## 2024-01-15 NOTE — PROGRESS NOTES
Subjective   Patient ID: Bren Carson is a 58 y.o. female who presents for Follow-up (Fu for trulicity and diuretic. She would like a recommendation on a new vascular specialist. ).    HPI   Patient presents today for 1-month checkup and refill of meds. She currently is without complaints. She states that she is taking her medicines as directed and is not having any side effects from them.  She has lost another pound since she was here last month.    She would like to get a second opinion from a different vascular surgeon.    12/18/2023  Patient comes in today for follow-up of her significant edema in her extremities.  Since she was here last week she has lost 19 pounds and is feeling much better.  The wound on her right lower extremity has quit weeping and is drying up and healing and looks much better.  She continues to take the new water pill as well as the potassium daily and is due to complete the metolazone on Wednesday.    Her hemoglobin A1c had gone up from 6.9 to 7.0 now.  We discussed this today and I have recommended that she start Trulicity weekly.  She is willing to do so.    Review of Systems   All other systems reviewed and are negative.      Objective   /80   Pulse 88   Temp 36.5 °C (97.7 °F)   Resp 20   Wt 111 kg (244 lb)   LMP  (LMP Unknown)   SpO2 92%   BMI 46.10 kg/m²     Physical Exam  Vitals reviewed.   Constitutional:       Appearance: She is well-developed.   HENT:      Head: Normocephalic and atraumatic.      Right Ear: Tympanic membrane, ear canal and external ear normal.      Left Ear: Tympanic membrane, ear canal and external ear normal.      Nose: Nose normal.      Mouth/Throat:      Mouth: Mucous membranes are moist.      Pharynx: Oropharynx is clear.   Eyes:      Extraocular Movements: Extraocular movements intact.      Conjunctiva/sclera: Conjunctivae normal.      Pupils: Pupils are equal, round, and reactive to light.   Cardiovascular:      Rate and Rhythm: Normal  rate and regular rhythm.      Heart sounds: Normal heart sounds. No murmur heard.  Pulmonary:      Effort: Pulmonary effort is normal.      Breath sounds: Normal breath sounds. No wheezing.   Abdominal:      General: Abdomen is flat. Bowel sounds are normal.      Palpations: Abdomen is soft.   Musculoskeletal:         General: Normal range of motion.   Skin:     General: Skin is warm and dry.   Neurological:      General: No focal deficit present.      Mental Status: She is alert and oriented to person, place, and time. Mental status is at baseline.   Psychiatric:         Mood and Affect: Mood normal.         Behavior: Behavior normal.         Thought Content: Thought content normal.         Judgment: Judgment normal.         Assessment/Plan   Problem List Items Addressed This Visit             ICD-10-CM    Diabetes mellitus, type 2 (CMS/[facility]) E11.9    Morbid obesity with BMI of 45.0-49.9, adult (CMS/[facility]) E66.01, Z68.42     Other Visit Diagnoses         Codes    Complicated varicose veins    -  Primary I83.899    Relevant Orders    Referral to Vascular Surgery        Continue current meds as directed.  Followup in 6 weeks for recheck if all remains stable, sooner if problems arise.  Follow-up with vascular surgery.  Medication list reconciled.  Thank you for visiting today!      Professional services: Some of this note was completed using Dragon voice technology and sometimes the software misinterprets words. This may include unintended errors with respect to translation of words, typographical errors or grammar errors which may not have been identified prior to finalization of the chart note. Please take this into account when reading the note. Thank you.

## 2024-01-16 DIAGNOSIS — R60.0 EDEMA OF BOTH LEGS: ICD-10-CM

## 2024-01-18 ENCOUNTER — APPOINTMENT (OUTPATIENT)
Dept: PRIMARY CARE | Facility: CLINIC | Age: 59
End: 2024-01-18
Payer: COMMERCIAL

## 2024-01-22 RX ORDER — FUROSEMIDE 40 MG/1
40 TABLET ORAL DAILY
Qty: 30 TABLET | Refills: 1 | Status: SHIPPED | OUTPATIENT
Start: 2024-01-22 | End: 2024-01-30 | Stop reason: SDUPTHER

## 2024-01-22 RX ORDER — ERGOCALCIFEROL 1.25 MG/1
CAPSULE ORAL
Qty: 48 CAPSULE | Refills: 1 | Status: SHIPPED | OUTPATIENT
Start: 2024-01-22

## 2024-01-22 NOTE — TELEPHONE ENCOUNTER
Pharmacy request for high dose vit d. Pt level at 44 on 12/11/23 with no instructions to continue. Please advise.

## 2024-01-22 NOTE — TELEPHONE ENCOUNTER
Requested Prescriptions     Pending Prescriptions Disp Refills    furosemide (Lasix) 40 mg tablet [Pharmacy Med Name: FUROSEMIDE 40 MG TABLET] 30 tablet 1     Sig: TAKE 1 TABLET (40 MG) BY MOUTH ONCE DAILY. AS NEEDED LEG SWELLING

## 2024-01-29 DIAGNOSIS — E11.9 TYPE 2 DIABETES MELLITUS WITHOUT COMPLICATION, WITHOUT LONG-TERM CURRENT USE OF INSULIN (MULTI): Primary | ICD-10-CM

## 2024-01-29 DIAGNOSIS — R60.0 EDEMA OF BOTH LEGS: ICD-10-CM

## 2024-01-29 NOTE — TELEPHONE ENCOUNTER
Patient is requesting a refill for her       Lasix 40 mg      Sent to [email or web address]      Patient last seen on 1/15/24      Patient states that [facility] told her that they can not fill it because it is a 90 day and that it needs to go to [facility].  Patient is also stating that since starting the Trulicity she has gained a pound and wondering if the dose needs to be increased?  Patient can be reached at [phone].

## 2024-01-30 DIAGNOSIS — R60.0 EDEMA OF BOTH LEGS: ICD-10-CM

## 2024-01-30 RX ORDER — FUROSEMIDE 40 MG/1
40 TABLET ORAL DAILY
Qty: 90 TABLET | Refills: 0 | Status: SHIPPED | OUTPATIENT
Start: 2024-01-30 | End: 2024-04-29

## 2024-01-30 RX ORDER — DULAGLUTIDE 1.5 MG/.5ML
1.5 INJECTION, SOLUTION SUBCUTANEOUS
Qty: 6 ML | Refills: 0 | Status: SHIPPED | OUTPATIENT
Start: 2024-01-30 | End: 2024-03-13 | Stop reason: ALTCHOICE

## 2024-01-30 NOTE — TELEPHONE ENCOUNTER
Requested Prescriptions     Pending Prescriptions Disp Refills    furosemide (Lasix) 40 mg tablet 30 tablet 1     Sig: Take 1 tablet (40 mg) by mouth once daily. As needed leg swelling

## 2024-02-01 NOTE — TELEPHONE ENCOUNTER
Last medication fill 1/30/24 [facility]   Refill not appropriate at this time.

## 2024-02-02 RX ORDER — FUROSEMIDE 40 MG/1
TABLET ORAL
Refills: 0 | OUTPATIENT
Start: 2024-02-02

## 2024-02-26 ENCOUNTER — OFFICE VISIT (OUTPATIENT)
Dept: PRIMARY CARE | Facility: CLINIC | Age: 59
End: 2024-02-26
Payer: COMMERCIAL

## 2024-02-26 ENCOUNTER — LAB (OUTPATIENT)
Dept: LAB | Facility: LAB | Age: 59
End: 2024-02-26
Payer: COMMERCIAL

## 2024-02-26 DIAGNOSIS — I49.9 CARDIAC ARRHYTHMIA, UNSPECIFIED CARDIAC ARRHYTHMIA TYPE: ICD-10-CM

## 2024-02-26 DIAGNOSIS — E66.01 MORBID OBESITY WITH BMI OF 45.0-49.9, ADULT (MULTI): ICD-10-CM

## 2024-02-26 DIAGNOSIS — M06.9 RHEUMATOID ARTHRITIS, INVOLVING UNSPECIFIED SITE, UNSPECIFIED WHETHER RHEUMATOID FACTOR PRESENT (MULTI): ICD-10-CM

## 2024-02-26 DIAGNOSIS — I49.9 CARDIAC ARRHYTHMIA, UNSPECIFIED CARDIAC ARRHYTHMIA TYPE: Primary | ICD-10-CM

## 2024-02-26 DIAGNOSIS — I10 BENIGN ESSENTIAL HYPERTENSION: ICD-10-CM

## 2024-02-26 LAB
ALBUMIN SERPL BCP-MCNC: 4 G/DL (ref 3.4–5)
ALP SERPL-CCNC: 70 U/L (ref 33–110)
ALT SERPL W P-5'-P-CCNC: 16 U/L (ref 7–45)
ANION GAP SERPL CALC-SCNC: 11 MMOL/L (ref 10–20)
AST SERPL W P-5'-P-CCNC: 18 U/L (ref 9–39)
BILIRUB SERPL-MCNC: 0.4 MG/DL (ref 0–1.2)
BUN SERPL-MCNC: 17 MG/DL (ref 6–23)
CALCIUM SERPL-MCNC: 9.5 MG/DL (ref 8.6–10.3)
CHLORIDE SERPL-SCNC: 100 MMOL/L (ref 98–107)
CO2 SERPL-SCNC: 33 MMOL/L (ref 21–32)
CREAT SERPL-MCNC: 0.69 MG/DL (ref 0.5–1.05)
EGFRCR SERPLBLD CKD-EPI 2021: >90 ML/MIN/1.73M*2
GLUCOSE SERPL-MCNC: 114 MG/DL (ref 74–99)
POTASSIUM SERPL-SCNC: 4.2 MMOL/L (ref 3.5–5.3)
PROT SERPL-MCNC: 6.8 G/DL (ref 6.4–8.2)
SODIUM SERPL-SCNC: 140 MMOL/L (ref 136–145)

## 2024-02-26 PROCEDURE — 80053 COMPREHEN METABOLIC PANEL: CPT

## 2024-02-26 PROCEDURE — 93000 ELECTROCARDIOGRAM COMPLETE: CPT | Performed by: FAMILY MEDICINE

## 2024-02-26 PROCEDURE — 99214 OFFICE O/P EST MOD 30 MIN: CPT | Performed by: FAMILY MEDICINE

## 2024-02-26 PROCEDURE — 36415 COLL VENOUS BLD VENIPUNCTURE: CPT

## 2024-02-26 RX ORDER — SULFASALAZINE 500 MG/1
TABLET ORAL
COMMUNITY
Start: 2024-02-20

## 2024-02-26 RX ORDER — METHOTREXATE 25 MG/.5ML
INJECTION, SOLUTION SUBCUTANEOUS
COMMUNITY
Start: 2024-01-25

## 2024-02-26 NOTE — PROGRESS NOTES
Subjective   Patient ID: Bren Carson is a 58 y.o. female who presents for Follow-up (Fu on trulicity. She stopped taking it last week because she wasn't losing much weight. It was also making her have bowel movements often and abd pain. ).    HPI   Patient comes in today for follow-up on her Trulicity.  She has lost a total of 25 pounds since December but she feels a lot of that was due to the water pill that we had put her on.  She has been taking the Trulicity but she stopped taking it 2 weeks ago because she was having GI upset with it.  She also was recently started on Azulfidine by her rheumatologist and that seems to be upsetting her stomach as well but she claims she had the GI upset prior to starting that medication.    Patient also was told in the past that she has some irregularity to her heart and they at the time suspected atrial fib.  Today on listening her heartbeat is irregular but the EKG picked up sinus rhythm with PVCs and PACs.  She states that she is not noticing an irregular heartbeat at all.    1/15/2024  Patient presents today for 1-month checkup and refill of meds. She currently is without complaints. She states that she is taking her medicines as directed and is not having any side effects from them.  She has lost another pound since she was here last month.    She would like to get a second opinion from a different vascular surgeon.    12/18/2023  Patient comes in today for follow-up of her significant edema in her extremities.  Since she was here last week she has lost 19 pounds and is feeling much better.  The wound on her right lower extremity has quit weeping and is drying up and healing and looks much better.  She continues to take the new water pill as well as the potassium daily and is due to complete the metolazone on Wednesday.    Her hemoglobin A1c had gone up from 6.9 to 7.0 now.  We discussed this today and I have recommended that she start Trulicity weekly.  She is  willing to do so.    Review of Systems   All other systems reviewed and are negative.      Objective   /77   Pulse 82   Temp 36.7 °C (98 °F)   Resp 20   Wt 108 kg (239 lb)   LMP  (LMP Unknown)   SpO2 95%   BMI 45.16 kg/m²     Physical Exam  Vitals reviewed.   Constitutional:       Appearance: She is well-developed. She is morbidly obese.   HENT:      Head: Normocephalic and atraumatic.      Right Ear: Tympanic membrane, ear canal and external ear normal.      Left Ear: Tympanic membrane, ear canal and external ear normal.      Nose: Nose normal.      Mouth/Throat:      Mouth: Mucous membranes are moist.      Pharynx: Oropharynx is clear.   Eyes:      Extraocular Movements: Extraocular movements intact.      Conjunctiva/sclera: Conjunctivae normal.      Pupils: Pupils are equal, round, and reactive to light.      Comments: Patient wears glasses   Cardiovascular:      Rate and Rhythm: Normal rate and regular rhythm.      Heart sounds: Normal heart sounds. No murmur heard.  Pulmonary:      Effort: Pulmonary effort is normal.      Breath sounds: Normal breath sounds. No wheezing.   Abdominal:      General: Abdomen is flat. Bowel sounds are normal.      Palpations: Abdomen is soft.   Musculoskeletal:         General: Normal range of motion.   Skin:     General: Skin is warm and dry.   Neurological:      General: No focal deficit present.      Mental Status: She is alert and oriented to person, place, and time. Mental status is at baseline.   Psychiatric:         Mood and Affect: Mood normal.         Behavior: Behavior normal.         Thought Content: Thought content normal.         Judgment: Judgment normal.       Assessment/Plan   Problem List Items Addressed This Visit             ICD-10-CM    Benign essential hypertension I10    Relevant Orders    ECG 12 Lead (Completed)    Morbid obesity with BMI of 45.0-49.9, adult (CMS/[facility]) E66.01, Z68.42    Rheumatoid arthritis (CMS/[facility]) M06.9    Relevant Medications     Rasuvo, PF, 25 mg/0.5 mL auto-injector    sulfaSALAzine (Azulfidine) 500 mg tablet     Other Visit Diagnoses         Codes    Cardiac arrhythmia, unspecified cardiac arrhythmia type    -  Primary I49.9    Relevant Orders    Comprehensive Metabolic Panel (Completed)    ECG 12 Lead (Completed)        Await lab results.  Consider different medication for weight loss.  Medication list reconciled.  Thank you for visiting today!      Professional services: Some of this note was completed using Dragon voice technology and sometimes the software misinterprets words. This may include unintended errors with respect to translation of words, typographical errors or grammar errors which may not have been identified prior to finalization of the chart note. Please take this into account when reading the note. Thank you.

## 2024-02-27 VITALS
WEIGHT: 239 LBS | OXYGEN SATURATION: 95 % | BODY MASS INDEX: 45.12 KG/M2 | DIASTOLIC BLOOD PRESSURE: 77 MMHG | RESPIRATION RATE: 20 BRPM | HEART RATE: 82 BPM | HEIGHT: 61 IN | SYSTOLIC BLOOD PRESSURE: 117 MMHG | TEMPERATURE: 98 F

## 2024-03-01 ENCOUNTER — TELEPHONE (OUTPATIENT)
Dept: PRIMARY CARE | Facility: CLINIC | Age: 59
End: 2024-03-01
Payer: COMMERCIAL

## 2024-03-01 NOTE — TELEPHONE ENCOUNTER
----- Message from Octavio Garcia DO sent at 2/28/2024  7:07 AM EST -----  Regarding: Labs  Please notify patient that there is nothing in her lab work that should cause her irregular heartbeats.    At this point she should continue to stay off the Trulicity and let me know in 2 weeks how she is tolerating the Azulfidine with her stomach so we can decide what to try next for her sugar.  ----- Message -----  From: Lab, Background User  Sent: 2/26/2024  10:25 PM EST  To: Octavio Garcia DO

## 2024-03-13 ENCOUNTER — TELEPHONE (OUTPATIENT)
Dept: PRIMARY CARE | Facility: CLINIC | Age: 59
End: 2024-03-13
Payer: COMMERCIAL

## 2024-03-13 DIAGNOSIS — E11.9 TYPE 2 DIABETES MELLITUS WITHOUT COMPLICATION, WITHOUT LONG-TERM CURRENT USE OF INSULIN (MULTI): Primary | ICD-10-CM

## 2024-03-13 NOTE — TELEPHONE ENCOUNTER
Patient is calling to speak with the doctor about getting on a new medication for her diabetes.  Patient states that at her last appointment it was discussed taking her off her Trilogy and switching to another medication.  Patient can be reached at [phone].      Thank You

## 2024-03-13 NOTE — TELEPHONE ENCOUNTER
I spoke with patient this evening and she is ready to increase the Trulicity but there is a shortage of the 1.5 so we will discontinue the Trulicity and switch instead to Rybelsus 7 mg daily.  Prescription sent to pharmacy.

## 2024-03-26 ENCOUNTER — TELEPHONE (OUTPATIENT)
Dept: PRIMARY CARE | Facility: CLINIC | Age: 59
End: 2024-03-26
Payer: COMMERCIAL

## 2024-03-26 NOTE — TELEPHONE ENCOUNTER
[facility] is calling to clarify the prescription that was sent over for her Rybelsus.  Call back number is [phone] and the reference number is [id number].      Thank You

## 2024-03-27 NOTE — TELEPHONE ENCOUNTER
Pharmacist nikki asking whether pt ever tried 3 mg Rybelsus. Spoke with Pt- she has not but is on going on 7 mg because her Trulicity was not enough. Pharmacist advised, no further questions.

## 2024-04-28 DIAGNOSIS — E87.6 HYPOKALEMIA: ICD-10-CM

## 2024-04-30 RX ORDER — POTASSIUM CHLORIDE 1500 MG/1
20 TABLET, EXTENDED RELEASE ORAL DAILY
Qty: 30 TABLET | Refills: 2 | Status: SHIPPED | OUTPATIENT
Start: 2024-04-30

## 2024-04-30 NOTE — TELEPHONE ENCOUNTER
Pt last OV 2/26/24  Last med fill not found    Requested Prescriptions     Pending Prescriptions Disp Refills    Klor-Con M20 20 mEq ER tablet [Pharmacy Med Name: KLOR-CON M20 TABLET] 30 tablet 2     Sig: TAKE 1 TABLET (20 MEQ) BY MOUTH ONCE DAILY. DO NOT CRUSH, CHEW, OR SPLIT.

## 2024-06-20 DIAGNOSIS — E11.9 TYPE 2 DIABETES MELLITUS WITHOUT COMPLICATION, WITHOUT LONG-TERM CURRENT USE OF INSULIN (MULTI): ICD-10-CM

## 2024-06-20 NOTE — TELEPHONE ENCOUNTER
Requested Prescriptions     Pending Prescriptions Disp Refills    semaglutide (Rybelsus) 7 mg tablet 90 tablet 0     Sig: Take 1 tablet (7 mg) by mouth once daily.

## 2024-09-10 ENCOUNTER — OFFICE VISIT (OUTPATIENT)
Dept: PRIMARY CARE | Facility: CLINIC | Age: 59
End: 2024-09-10
Payer: COMMERCIAL

## 2024-09-10 VITALS
TEMPERATURE: 98 F | WEIGHT: 236 LBS | SYSTOLIC BLOOD PRESSURE: 138 MMHG | DIASTOLIC BLOOD PRESSURE: 89 MMHG | RESPIRATION RATE: 20 BRPM | HEART RATE: 106 BPM | OXYGEN SATURATION: 89 % | BODY MASS INDEX: 44.59 KG/M2

## 2024-09-10 DIAGNOSIS — J01.90 ACUTE SINUSITIS, RECURRENCE NOT SPECIFIED, UNSPECIFIED LOCATION: ICD-10-CM

## 2024-09-10 DIAGNOSIS — J20.9 ACUTE BRONCHITIS, UNSPECIFIED ORGANISM: ICD-10-CM

## 2024-09-10 DIAGNOSIS — R39.9 UTI SYMPTOMS: Primary | ICD-10-CM

## 2024-09-10 DIAGNOSIS — Z12.31 ENCOUNTER FOR SCREENING MAMMOGRAM FOR BREAST CANCER: ICD-10-CM

## 2024-09-10 LAB
POC APPEARANCE, URINE: CLEAR
POC BILIRUBIN, URINE: ABNORMAL
POC BLOOD, URINE: NEGATIVE
POC COLOR, URINE: ABNORMAL
POC GLUCOSE, URINE: NEGATIVE MG/DL
POC KETONES, URINE: ABNORMAL MG/DL
POC LEUKOCYTES, URINE: NEGATIVE
POC NITRITE,URINE: NEGATIVE
POC PH, URINE: 7 PH
POC PROTEIN, URINE: ABNORMAL MG/DL
POC SARS-COV-2 AG BINAX: NORMAL
POC SPECIFIC GRAVITY, URINE: >=1.03
POC UROBILINOGEN, URINE: 1 EU/DL

## 2024-09-10 PROCEDURE — 81003 URINALYSIS AUTO W/O SCOPE: CPT | Performed by: FAMILY MEDICINE

## 2024-09-10 PROCEDURE — 87811 SARS-COV-2 COVID19 W/OPTIC: CPT | Performed by: FAMILY MEDICINE

## 2024-09-10 PROCEDURE — 3075F SYST BP GE 130 - 139MM HG: CPT | Performed by: FAMILY MEDICINE

## 2024-09-10 PROCEDURE — 3079F DIAST BP 80-89 MM HG: CPT | Performed by: FAMILY MEDICINE

## 2024-09-10 PROCEDURE — 99214 OFFICE O/P EST MOD 30 MIN: CPT | Performed by: FAMILY MEDICINE

## 2024-09-10 RX ORDER — DOXYCYCLINE 100 MG/1
100 CAPSULE ORAL 2 TIMES DAILY
Qty: 20 CAPSULE | Refills: 0 | Status: SHIPPED | OUTPATIENT
Start: 2024-09-10 | End: 2024-09-13 | Stop reason: SINTOL

## 2024-09-10 NOTE — PROGRESS NOTES
Subjective   Patient ID: Bren Carson is a 59 y.o. female who presents for Cough (Started with post nasal drip on Friday. Cough with lots of phlegm. Saw telehealth, tested neg for covid Sunday. Took allergy med that did help dry up some congestion. ) and UTI (Pressure and odor and color. ).    HPI  Patient comes in today complaining of upper respiratory symptoms cough and congestion for the last 3 days.  She did take a COVID test on Sunday which was negative.  She has also been having UTI symptoms.  Her UA today is negative for blood, nitrites or leukocytes.  A repeat COVID test today was negative.    Her symptoms began on Friday.  On Saturday she had postnasal drip and a cough.  On Sunday she conferred with [facility] who recommended that she do the COVID test which was negative.  She has had a slight headache and sore throat and her ears feel plugged.  Her  is also starting to get the symptoms.  Her father-in-law just had CABG x 3 yesterday.    Review of Systems   All other systems reviewed and are negative.      Objective   Vitals:  /89   Pulse 106   Temp 36.7 °C (98 °F)   Resp 20   Wt 107 kg (236 lb)   LMP  (LMP Unknown)   SpO2 (!) 89%   BMI 44.59 kg/m²     Physical Exam  Vitals reviewed.   Constitutional:       Appearance: She is well-developed.   HENT:      Head: Normocephalic and atraumatic.      Right Ear: Tympanic membrane, ear canal and external ear normal.      Left Ear: Tympanic membrane, ear canal and external ear normal.      Nose: Congestion and rhinorrhea present.      Mouth/Throat:      Mouth: Mucous membranes are moist.      Pharynx: Oropharynx is clear.   Eyes:      Extraocular Movements: Extraocular movements intact.      Conjunctiva/sclera: Conjunctivae normal.      Pupils: Pupils are equal, round, and reactive to light.   Cardiovascular:      Rate and Rhythm: Normal rate and regular rhythm.      Heart sounds: Normal heart sounds. No murmur heard.  Pulmonary:      Effort:  Pulmonary effort is normal.      Breath sounds: Rhonchi (Mild scattered rhonchi bilateral lung fields without wheeze) present. No wheezing.   Abdominal:      General: Abdomen is flat. Bowel sounds are normal.      Palpations: Abdomen is soft.   Musculoskeletal:         General: Normal range of motion.   Skin:     General: Skin is warm and dry.   Neurological:      General: No focal deficit present.      Mental Status: She is alert and oriented to person, place, and time. Mental status is at baseline.   Psychiatric:         Mood and Affect: Mood normal.         Behavior: Behavior normal.         Thought Content: Thought content normal.         Judgment: Judgment normal.         Assessment/Plan   Problem List Items Addressed This Visit       Acute bronchitis     Other Visit Diagnoses       UTI symptoms    -  Primary    Relevant Orders    POCT UA Automated manually resulted (Completed)    Acute sinusitis, recurrence not specified, unspecified location        Relevant Medications    doxycycline (Monodox) 100 mg capsule    Other Relevant Orders    POCT BinaxNOW Covid-19 Ag Card manually resulted (Completed)    Encounter for screening mammogram for breast cancer        Relevant Orders    BI mammo bilateral screening tomosynthesis        Use meds as directed.  Return to clinic if symptoms do not improve in 10-14 days.    Should the condition worsen contact me and return here or if after hours seek further evaluation at an urgent care or in the emergency room.  Will contact patient with mammogram results when available.  Medication list reconciled.  Thank you for visiting today!      Professional services: Some of this note was completed using Dragon voice technology and sometimes the software misinterprets words. This may include unintended errors with respect to translation of words, typographical errors or grammar errors which may not have been identified prior to finalization of the chart note. Please take this into  account when reading the note. Thank you.       Octavio Garcia,  09/11/24 7:02 AM

## 2024-09-13 ENCOUNTER — TELEPHONE (OUTPATIENT)
Dept: PRIMARY CARE | Facility: CLINIC | Age: 59
End: 2024-09-13
Payer: COMMERCIAL

## 2024-09-13 DIAGNOSIS — J01.90 ACUTE SINUSITIS, RECURRENCE NOT SPECIFIED, UNSPECIFIED LOCATION: Primary | ICD-10-CM

## 2024-09-13 RX ORDER — CEFDINIR 300 MG/1
300 CAPSULE ORAL 2 TIMES DAILY
Qty: 20 CAPSULE | Refills: 0 | Status: SHIPPED | OUTPATIENT
Start: 2024-09-13 | End: 2024-09-23

## 2024-09-13 NOTE — TELEPHONE ENCOUNTER
Pt reporting extreme GI issues with the Doxycycline. She tried taking with food. It is giving her nausea, diarrhea and she is vomiting.   She is asking for an alternative.

## 2024-10-07 DIAGNOSIS — E11.9 TYPE 2 DIABETES MELLITUS WITHOUT COMPLICATION, WITHOUT LONG-TERM CURRENT USE OF INSULIN (MULTI): ICD-10-CM

## 2024-10-07 NOTE — TELEPHONE ENCOUNTER
Rx Refill Request Telephone Encounter    Name:  Bren Carson  :  [id number]  Medication Name:  semaglutide (Rybelsus) 7 mg tablet       Specific Pharmacy location:    [facility]/pharmacy [address] [place] [address] AT [facility] Phone: [phone]   Fax: [phone]        Date of last appointment:  24  Date of next appointment:  10/10/24  Best number to reach patient:  [phone]      Thank You

## 2024-10-07 NOTE — TELEPHONE ENCOUNTER
Pt last OV 9/10/2024    Requested Prescriptions     Pending Prescriptions Disp Refills    semaglutide (Rybelsus) 7 mg tablet 90 tablet 0     Sig: Take 1 tablet (7 mg) by mouth once daily.

## 2024-10-10 ENCOUNTER — OFFICE VISIT (OUTPATIENT)
Dept: PRIMARY CARE | Facility: CLINIC | Age: 59
End: 2024-10-10
Payer: COMMERCIAL

## 2024-10-10 VITALS
HEART RATE: 106 BPM | OXYGEN SATURATION: 96 % | BODY MASS INDEX: 45.35 KG/M2 | RESPIRATION RATE: 20 BRPM | TEMPERATURE: 97.2 F | DIASTOLIC BLOOD PRESSURE: 79 MMHG | SYSTOLIC BLOOD PRESSURE: 123 MMHG | WEIGHT: 231 LBS | HEIGHT: 60 IN

## 2024-10-10 DIAGNOSIS — E66.01 MORBID OBESITY WITH BMI OF 45.0-49.9, ADULT (MULTI): ICD-10-CM

## 2024-10-10 DIAGNOSIS — M06.9 RHEUMATOID ARTHRITIS, INVOLVING UNSPECIFIED SITE, UNSPECIFIED WHETHER RHEUMATOID FACTOR PRESENT (MULTI): ICD-10-CM

## 2024-10-10 DIAGNOSIS — R60.0 EDEMA OF BOTH LEGS: ICD-10-CM

## 2024-10-10 DIAGNOSIS — E11.9 TYPE 2 DIABETES MELLITUS WITHOUT COMPLICATION, WITHOUT LONG-TERM CURRENT USE OF INSULIN (MULTI): Primary | ICD-10-CM

## 2024-10-10 DIAGNOSIS — K21.9 GASTROESOPHAGEAL REFLUX DISEASE WITHOUT ESOPHAGITIS: ICD-10-CM

## 2024-10-10 PROCEDURE — 99214 OFFICE O/P EST MOD 30 MIN: CPT | Performed by: FAMILY MEDICINE

## 2024-10-10 RX ORDER — FUROSEMIDE 40 MG/1
40 TABLET ORAL DAILY
Qty: 90 TABLET | Refills: 0 | Status: SHIPPED | OUTPATIENT
Start: 2024-10-10 | End: 2025-01-08

## 2024-10-10 NOTE — PROGRESS NOTES
Subjective   Patient ID: Bren Carson is a 59 y.o. female who presents for Follow-up (Fu for rybelsus. ).    HPI  Patient comes in today for follow-up on her Rybelsus.  She is having problems with occasional cramping and diarrhea which began around the same time as she started the Rybelsus as well as the Azulfidine so she is not sure which one is causing the problem or if it is a combination of the 2.  She has lost 8 pounds since starting the medicine in June and she is down 33 pounds since December 2023.  She would like to continue the medication.    Review of Systems   All other systems reviewed and are negative.      Objective   Vitals:  /79   Pulse 106   Temp 36.2 °C (97.2 °F)   Resp 20   Ht 1.524 m (5')   Wt 105 kg (231 lb)   LMP  (LMP Unknown)   SpO2 96%   BMI 45.11 kg/m²     Physical Exam  Vitals reviewed.   Constitutional:       Appearance: She is morbidly obese.   HENT:      Head: Normocephalic and atraumatic.      Right Ear: Tympanic membrane, ear canal and external ear normal.      Left Ear: Tympanic membrane, ear canal and external ear normal.      Nose: Nose normal.      Mouth/Throat:      Mouth: Mucous membranes are moist.      Pharynx: Oropharynx is clear.   Eyes:      Extraocular Movements: Extraocular movements intact.      Conjunctiva/sclera: Conjunctivae normal.      Pupils: Pupils are equal, round, and reactive to light.   Cardiovascular:      Rate and Rhythm: Normal rate and regular rhythm.      Heart sounds: Normal heart sounds. No murmur heard.  Pulmonary:      Effort: Pulmonary effort is normal.      Breath sounds: Normal breath sounds. No wheezing.   Abdominal:      General: Abdomen is flat. Bowel sounds are normal.      Palpations: Abdomen is soft.   Musculoskeletal:         General: Normal range of motion.   Skin:     General: Skin is warm and dry.   Neurological:      General: No focal deficit present.      Mental Status: She is alert and oriented to person, place,  and time. Mental status is at baseline.   Psychiatric:         Mood and Affect: Mood normal.         Behavior: Behavior normal.         Thought Content: Thought content normal.         Judgment: Judgment normal.       Assessment/Plan   Problem List Items Addressed This Visit       Diabetes mellitus, type 2 (Multi) - Primary    Relevant Medications    semaglutide (Rybelsus) 7 mg tablet    Edema of both legs    Relevant Medications    furosemide (Lasix) 40 mg tablet    Esophageal reflux    Morbid obesity with BMI of 45.0-49.9, adult (Multi)    Rheumatoid arthritis   Continue current meds as directed.  Follow up in 3 months for recheck if all remains stable, sooner if problems arise.  Medication list reconciled.  Thank you for visiting today!      Professional services: Some of this note was completed using Dragon voice technology and sometimes the software misinterprets words. This may include unintended errors with respect to translation of words, typographical errors or grammar errors which may not have been identified prior to finalization of the chart note. Please take this into account when reading the note. Thank you.       Octavio Garcia DO 10/11/24 6:35 AM

## 2024-10-15 DIAGNOSIS — E53.8 VITAMIN B12 DEFICIENCY: ICD-10-CM

## 2024-10-15 DIAGNOSIS — E55.9 VITAMIN D DEFICIENCY: ICD-10-CM

## 2024-10-15 DIAGNOSIS — E78.2 HYPERLIPIDEMIA, MIXED: ICD-10-CM

## 2024-10-15 DIAGNOSIS — E11.9 TYPE 2 DIABETES MELLITUS WITHOUT COMPLICATION, WITHOUT LONG-TERM CURRENT USE OF INSULIN (MULTI): Primary | ICD-10-CM

## 2024-11-12 ENCOUNTER — OFFICE VISIT (OUTPATIENT)
Dept: PRIMARY CARE | Facility: CLINIC | Age: 59
End: 2024-11-12
Payer: COMMERCIAL

## 2024-11-12 VITALS
HEART RATE: 87 BPM | OXYGEN SATURATION: 97 % | TEMPERATURE: 97.3 F | DIASTOLIC BLOOD PRESSURE: 66 MMHG | WEIGHT: 230 LBS | RESPIRATION RATE: 16 BRPM | BODY MASS INDEX: 44.92 KG/M2 | SYSTOLIC BLOOD PRESSURE: 107 MMHG

## 2024-11-12 DIAGNOSIS — E11.9 TYPE 2 DIABETES MELLITUS WITHOUT COMPLICATION, WITHOUT LONG-TERM CURRENT USE OF INSULIN (MULTI): Primary | ICD-10-CM

## 2024-11-12 DIAGNOSIS — E66.01 MORBID OBESITY WITH BMI OF 40.0-44.9, ADULT (MULTI): ICD-10-CM

## 2024-11-12 DIAGNOSIS — F43.9 STRESS AT HOME: ICD-10-CM

## 2024-11-12 DIAGNOSIS — I10 HYPERTENSION, ESSENTIAL, BENIGN: ICD-10-CM

## 2024-11-12 DIAGNOSIS — F32.A DEPRESSION, UNSPECIFIED DEPRESSION TYPE: ICD-10-CM

## 2024-11-12 DIAGNOSIS — Z56.6 STRESS AT WORK: ICD-10-CM

## 2024-11-12 DIAGNOSIS — E78.2 HYPERLIPIDEMIA, MIXED: ICD-10-CM

## 2024-11-12 DIAGNOSIS — E55.9 VITAMIN D DEFICIENCY: ICD-10-CM

## 2024-11-12 DIAGNOSIS — I10 BENIGN ESSENTIAL HYPERTENSION: ICD-10-CM

## 2024-11-12 DIAGNOSIS — I49.9 CARDIAC ARRHYTHMIA, UNSPECIFIED CARDIAC ARRHYTHMIA TYPE: ICD-10-CM

## 2024-11-12 DIAGNOSIS — R60.0 EDEMA OF BOTH LEGS: ICD-10-CM

## 2024-11-12 DIAGNOSIS — R25.2 HAND CRAMPS: ICD-10-CM

## 2024-11-12 DIAGNOSIS — E53.8 VITAMIN B12 DEFICIENCY: ICD-10-CM

## 2024-11-12 LAB — POC HEMOGLOBIN A1C: 5.6 % (ref 4.2–6.5)

## 2024-11-12 PROCEDURE — 93000 ELECTROCARDIOGRAM COMPLETE: CPT | Performed by: FAMILY MEDICINE

## 2024-11-12 PROCEDURE — 83036 HEMOGLOBIN GLYCOSYLATED A1C: CPT | Performed by: FAMILY MEDICINE

## 2024-11-12 PROCEDURE — 99214 OFFICE O/P EST MOD 30 MIN: CPT | Performed by: FAMILY MEDICINE

## 2024-11-12 SDOH — HEALTH STABILITY - MENTAL HEALTH: OTHER PHYSICAL AND MENTAL STRAIN RELATED TO WORK: Z56.6

## 2024-11-12 NOTE — PROGRESS NOTES
"Subjective   Patient ID: Bren Carson is a 59 y.o. female who presents for Follow-up (Stopped taking her Rybelsus Friday because it was upsetting her stomach- cramping, nausea, diarrhea, and vomiting. ).    HPI  Patient comes in today for follow-up on her Rybelsus medication.  She was taking Rybelsus for her blood sugar as well as for weight loss and it initially helped her to lose weight however she was having a lot of gastric upset and she was not sure where it was coming from.  We discussed this at her last visit and thought sulfasalazine or methotrexate may be irritating her stomach but she ran out of the sulfasalazine and was off the methotrexate for a week and she was still having the stomach turmoil.  She stopped the Rybelsus last Friday and her stomach has been feeling better since.  Will have her remain off the Rybelsus at this time.  She had been on Trulicity prior to the Rybelsus but was not losing any weight with it.    She does not have any taste or smell.  That has been going on since 2017.  We have had her see several specialists about this and they told her this idiopathic and they were not sure for if it would ever come back.    Patient is also feeling depressed.  There is a lot of stress at work as she feels like they are trying to push out the older workers.  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  There are also issues at home.  Her  is semiretired and there are projects that need to be done around the house that are costly but still need to be done and she is frustrated because they are procrastinating and are not getting done.  She feels like she already takes enough medicine and does not want to take another \"pill\" at this point and would like to talk to someone about it.    Patient is also frustrated over her inability to lose weight although she has lost 34 pounds since December 2023.  Since " starting the Rybelsus however she has only lost about 9 pounds.    Review of Systems   All other systems reviewed and are negative.      Objective   Vitals:  /66   Pulse 87   Temp 36.3 °C (97.3 °F)   Resp 16   Wt 104 kg (230 lb)   LMP  (LMP Unknown)   SpO2 97%   BMI 44.92 kg/m²     Physical Exam  Vitals reviewed.   Constitutional:       Appearance: She is morbidly obese.   HENT:      Head: Normocephalic and atraumatic.      Right Ear: Tympanic membrane, ear canal and external ear normal.      Left Ear: Tympanic membrane, ear canal and external ear normal.      Nose: Nose normal.      Mouth/Throat:      Mouth: Mucous membranes are moist.      Pharynx: Oropharynx is clear.   Eyes:      Extraocular Movements: Extraocular movements intact.      Conjunctiva/sclera: Conjunctivae normal.      Pupils: Pupils are equal, round, and reactive to light.   Cardiovascular:      Rate and Rhythm: Normal rate and regular rhythm.      Heart sounds: Normal heart sounds. No murmur heard.  Pulmonary:      Effort: Pulmonary effort is normal.      Breath sounds: Normal breath sounds. No wheezing.   Abdominal:      General: Abdomen is flat. Bowel sounds are normal.      Palpations: Abdomen is soft.   Musculoskeletal:         General: Normal range of motion.   Skin:     General: Skin is warm and dry.   Neurological:      General: No focal deficit present.      Mental Status: She is alert and oriented to person, place, and time. Mental status is at baseline.   Psychiatric:         Mood and Affect: Mood normal.         Behavior: Behavior normal.         Thought Content: Thought content normal.         Judgment: Judgment normal.         Assessment/Plan   Problem List Items Addressed This Visit       Diabetes mellitus, type 2 (Multi) - Primary    Relevant Orders    POCT glycosylated hemoglobin (Hb A1C) manually resulted (Completed)    Referral to Clinical Pharmacy    Edema of both legs    Hyperlipidemia, mixed    Relevant Orders     Lipid Panel    Vitamin D deficiency    Relevant Orders    Vitamin D 25-Hydroxy,Total (for eval of Vitamin D levels)    Hypertension, essential, benign    Relevant Orders    Comprehensive Metabolic Panel    Morbid obesity with BMI of 40.0-44.9, adult (Multi)    Relevant Orders    Referral to Clinical Pharmacy    Vitamin B12 deficiency    Relevant Orders    CBC    Vitamin B12    Depression    Relevant Orders    Follow Up In [facility]    Stress at home    Relevant Orders    Follow [facility]    Stress at work    Relevant Orders    Follow Up In [facility]    Cardiac arrhythmia    Hand cramps     Other Visit Diagnoses       Encounter for health maintenance examination        Relevant Orders    ECG 12 Lead    Benign essential hypertension        Relevant Orders    ECG 12 Lead        Will contact patient with lab results when available.  Will get clinical pharmacy involved to help comanage weight loss.  Will get behavioral health involved to help comanage the depression and stress  Continue all current meds.  RTC in one month for recheck, sooner should problems arise.  Medication list reconciled.  Thank you for visiting today!      Professional services: Some of this note was completed using Dragon voice technology and sometimes the software misinterprets words. This may include unintended errors with respect to translation of words, typographical errors or grammar errors which may not have been identified prior to finalization of the chart note. Please take this into account when reading the note. Thank you.       Octavio Garcia DO 11/12/24 8:13 PM

## 2024-11-15 LAB
NON-UH HIE A/G RATIO: 1.3
NON-UH HIE ALB: 3.5 G/DL (ref 3.4–5)
NON-UH HIE ALK PHOS: 76 UNIT/L (ref 45–117)
NON-UH HIE BILIRUBIN, TOTAL: 0.7 MG/DL (ref 0.3–1.2)
NON-UH HIE BUN/CREAT RATIO: 34.3
NON-UH HIE BUN: 24 MG/DL (ref 9–23)
NON-UH HIE CALCIUM: 9.1 MG/DL (ref 8.7–10.4)
NON-UH HIE CALCULATED LDL CHOLESTEROL: 113 MG/DL (ref 60–130)
NON-UH HIE CALCULATED OSMOLALITY: 289 MOSM/KG (ref 275–295)
NON-UH HIE CHLORIDE: 105 MMOL/L (ref 98–107)
NON-UH HIE CHOLESTEROL: 177 MG/DL (ref 100–200)
NON-UH HIE CO2, VENOUS: 29 MMOL/L (ref 20–31)
NON-UH HIE CREATININE: 0.7 MG/DL (ref 0.5–0.8)
NON-UH HIE GFR AA: >60
NON-UH HIE GLOBULIN: 2.6 G/DL
NON-UH HIE GLOMERULAR FILTRATION RATE: >60 ML/MIN/1.73M?
NON-UH HIE GLUCOSE: 97 MG/DL (ref 74–106)
NON-UH HIE GOT: 24 UNIT/L (ref 15–37)
NON-UH HIE GPT: 21 UNIT/L (ref 10–49)
NON-UH HIE HCT: 42.6 % (ref 36–46)
NON-UH HIE HDL CHOLESTEROL: 48 MG/DL (ref 40–60)
NON-UH HIE HGB: 13.9 G/DL (ref 12–16)
NON-UH HIE INSTR WBC ND: 6.3
NON-UH HIE K: 4.1 MMOL/L (ref 3.5–5.1)
NON-UH HIE MCH: 31.5 PG (ref 27–34)
NON-UH HIE MCHC: 32.6 G/DL (ref 32–37)
NON-UH HIE MCV: 96.7 FL (ref 80–100)
NON-UH HIE MPV: 8.5 FL (ref 7.4–10.4)
NON-UH HIE NA: 143 MMOL/L (ref 135–145)
NON-UH HIE PLATELET: 238 X10 (ref 150–450)
NON-UH HIE RBC: 4.41 X10 (ref 4.2–5.4)
NON-UH HIE RDW: 15.9 % (ref 11.5–14.5)
NON-UH HIE TOTAL CHOL/HDL CHOL RATIO: 3.7
NON-UH HIE TOTAL PROTEIN: 6.1 G/DL (ref 5.7–8.2)
NON-UH HIE TRIGLYCERIDES: 79 MG/DL (ref 30–150)
NON-UH HIE VIT D 25: 50 NG/ML
NON-UH HIE VITAMIN B12: 725 PG/ML (ref 211–911)
NON-UH HIE WBC: 6.3 X10 (ref 4.5–11)

## 2024-11-18 ENCOUNTER — TELEPHONE (OUTPATIENT)
Dept: PRIMARY CARE | Facility: CLINIC | Age: 59
End: 2024-11-18

## 2024-11-18 ENCOUNTER — APPOINTMENT (OUTPATIENT)
Dept: PHARMACY | Facility: HOSPITAL | Age: 59
End: 2024-11-18
Payer: COMMERCIAL

## 2024-11-18 DIAGNOSIS — E11.9 TYPE 2 DIABETES MELLITUS WITHOUT COMPLICATION, WITHOUT LONG-TERM CURRENT USE OF INSULIN (MULTI): ICD-10-CM

## 2024-11-18 DIAGNOSIS — E66.01 MORBID OBESITY WITH BMI OF 40.0-44.9, ADULT (MULTI): ICD-10-CM

## 2024-11-18 NOTE — PROGRESS NOTES
"Subjective     Patient ID: Bren Carsno is a 59 y.o. female who presents for diabetes management.    HPI    Allergies   Allergen Reactions    Doxycycline GI Upset     Severe GI upset    Amitriptyline Other     Reactions: \"Knocked me out\"    Claritin [Loratadine] Unknown    Crestor [Rosuvastatin] Unknown    Levaquin [Levofloxacin] Unknown    Pantoprazole Unknown    Prilosec [Omeprazole] Unknown    Rybelsus [Semaglutide] Other     Stomach cramping, diarrhea and emesis    Simvastatin Unknown     Objective     Current DM Pharmacotherapy:   - none, pt was stopped on Rybelsus    SECONDARY PREVENTION  - Statin? No  - ACE-I/ARB? No  - Aspirin? No    Pertinent PMH Review:  - PMH of Pancreatitis: No  - PMH/FH of Medullary Thyroid Cancer: No  - PMH of Retinopathy: No  - PMH of Urinary Tract Infections: No    Lab Review  Lab Results   Component Value Date    BILITOT 0.4 02/26/2024    CALCIUM 9.5 02/26/2024    CO2 33 (H) 02/26/2024     02/26/2024    CREATININE 0.69 02/26/2024    GLUCOSE 114 (H) 02/26/2024    ALKPHOS 70 02/26/2024    K 4.2 02/26/2024    PROT 6.8 02/26/2024     02/26/2024    AST 18 02/26/2024    ALT 16 02/26/2024    BUN 17 02/26/2024    ANIONGAP 11 02/26/2024    MG 1.80 11/16/2022    ALBUMIN 4.0 02/26/2024    GFRF >90 03/21/2023     No results found for: \"TRIG\", \"CHOL\", \"LDL\", \"LDLCALC\", \"HDL\"  Lab Results   Component Value Date    HGBA1C 5.6 11/12/2024     The ASCVD Risk score (Bushra DK, et al., 2019) failed to calculate for the following reasons:    Cannot find a previous HDL lab    Cannot find a previous total cholesterol lab    Assessment/Plan     Problem List Items Addressed This Visit       Diabetes mellitus, type 2 (Multi)    Morbid obesity with BMI of 40.0-44.9, adult (Multi)   Patient was taking Rybelsus for her diabetes, which controled her A1C to less then 7%. However, medication was causing severe nausea, vomiting and cramping. Patient discontinued Rybelsus and sx have resolved. " Patient was on Trulicity in the past and had no issues with medication, but had to stop medication due to back order. Discussed start Mounjaro instead and patient is agreeable. Insurance needs a PA. Will submit and follow up in 1 week with insurance determination.    Type 2 diabetes mellitus, is at goal. <7%    Follow up: I recommend diabetes care be 1 week.    Felicia Gonzalez, PharmD    Continue all meds under the continuation of care with the referring provider and clinical pharmacy team

## 2024-11-18 NOTE — TELEPHONE ENCOUNTER
Patient called in this afternoon stating that she did she her labs on my chart and she would like to discuss them with the doctor.  Patient also wanted to let the doctor know that she was still not feeling well even after following the doctors instructions.  Patient can be reached at [phone].      Thank You

## 2024-11-19 NOTE — TELEPHONE ENCOUNTER
I spoke with patient this evening and she is still feeling tired.  She just did all her lab work and all of it actually looks very good so nothing to explain the tiredness there.  Patient is morbidly obese and we are in the process of seeing if her insurance will cover Mounjaro through clinical pharmacy.  Patient also reports that sometimes she feels short of breath.  I recommend she get a pulse oximeter from the drugstore and check and see if she truly is short of breath or if that is just more of an anxious feeling.  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  She is supposed to be seeing her tomorrow.

## 2024-11-25 ENCOUNTER — APPOINTMENT (OUTPATIENT)
Dept: PHARMACY | Facility: HOSPITAL | Age: 59
End: 2024-11-25
Payer: COMMERCIAL

## 2024-11-25 DIAGNOSIS — E11.9 TYPE 2 DIABETES MELLITUS WITHOUT COMPLICATION, WITHOUT LONG-TERM CURRENT USE OF INSULIN (MULTI): ICD-10-CM

## 2024-11-25 DIAGNOSIS — E66.01 MORBID OBESITY WITH BMI OF 40.0-44.9, ADULT (MULTI): ICD-10-CM

## 2024-11-25 RX ORDER — TIRZEPATIDE 2.5 MG/.5ML
2.5 INJECTION, SOLUTION SUBCUTANEOUS WEEKLY
Qty: 2 ML | Refills: 0 | Status: SHIPPED | OUTPATIENT
Start: 2024-11-25

## 2024-11-25 NOTE — PROGRESS NOTES
"Subjective     Patient ID: Bren Carson is a 59 y.o. female who presents for diabetes management.    HPI    Allergies   Allergen Reactions    Doxycycline GI Upset     Severe GI upset    Amitriptyline Other     Reactions: \"Knocked me out\"    Claritin [Loratadine] Unknown    Crestor [Rosuvastatin] Unknown    Levaquin [Levofloxacin] Unknown    Pantoprazole Unknown    Prilosec [Omeprazole] Unknown    Rybelsus [Semaglutide] Other     Stomach cramping, diarrhea and emesis    Simvastatin Unknown     Objective     Current DM Pharmacotherapy:   - none, pt was stopped on Rybelsus    SECONDARY PREVENTION  - Statin? No  - ACE-I/ARB? No  - Aspirin? No    Pertinent PMH Review:  - PMH of Pancreatitis: No  - PMH/FH of Medullary Thyroid Cancer: No  - PMH of Retinopathy: No  - PMH of Urinary Tract Infections: No    Lab Review  Lab Results   Component Value Date    BILITOT 0.4 02/26/2024    CALCIUM 9.5 02/26/2024    CO2 33 (H) 02/26/2024     02/26/2024    CREATININE 0.69 02/26/2024    GLUCOSE 114 (H) 02/26/2024    ALKPHOS 70 02/26/2024    K 4.2 02/26/2024    PROT 6.8 02/26/2024     02/26/2024    AST 18 02/26/2024    ALT 16 02/26/2024    BUN 17 02/26/2024    ANIONGAP 11 02/26/2024    MG 1.80 11/16/2022    ALBUMIN 4.0 02/26/2024    GFRF >90 03/21/2023     No results found for: \"TRIG\", \"CHOL\", \"LDL\", \"LDLCALC\", \"HDL\"  Lab Results   Component Value Date    HGBA1C 5.6 11/12/2024     The ASCVD Risk score (Bushra DK, et al., 2019) failed to calculate for the following reasons:    Cannot find a previous HDL lab    Cannot find a previous total cholesterol lab    Assessment/Plan     Problem List Items Addressed This Visit       Diabetes mellitus, type 2 (Multi)    Morbid obesity with BMI of 40.0-44.9, adult (Multi)   PA for Mounjaro was approved. Estimated copay is $25 per month with copay card. Will start patient on Mounjaro 2.5 mg dose and will follow up in 3 weeks with insurance determination.    Mounjaro Education:    - " Counseled patient on Mounjaro MOA, expectations, side effects, duration of therapy, administration, and monitoring parameters.  - Provided detailed dosing and administration counseling to ensure proper technique.   - Reviewed Mounjaro titration schedule, starting with 2.5 mg once weekly to 5 mg, 7.5 mg, 10 mg, 12.5 mg and if tolerated 15 mg.  - Counseled patient on the benefits of GiP, such as cardiovascular risk reduction, glycemic control, and weight loss potential.  - Reviewed storage requirements of Mounjaro when not in use, and when to administer the medication if a dose is missed.  - Advised patient that they may experience improved satiety after meals and portion sizes of meals may be reduced as doses of Mounjaro increase.    Type 2 diabetes mellitus, is at goal. <7%    START Mounjaro 2.5 mg weekly  Follow up: I recommend diabetes care be 3 weeks.    Felicia Gonzalez, PharmD    Continue all meds under the continuation of care with the referring provider and clinical pharmacy team

## 2024-12-02 ENCOUNTER — OFFICE VISIT (OUTPATIENT)
Dept: PRIMARY CARE | Facility: CLINIC | Age: 59
End: 2024-12-02
Payer: COMMERCIAL

## 2024-12-02 VITALS
WEIGHT: 238 LBS | DIASTOLIC BLOOD PRESSURE: 83 MMHG | HEART RATE: 90 BPM | SYSTOLIC BLOOD PRESSURE: 136 MMHG | OXYGEN SATURATION: 95 % | RESPIRATION RATE: 20 BRPM | BODY MASS INDEX: 46.48 KG/M2 | TEMPERATURE: 97.5 F

## 2024-12-02 DIAGNOSIS — E11.9 TYPE 2 DIABETES MELLITUS WITHOUT COMPLICATION, WITHOUT LONG-TERM CURRENT USE OF INSULIN (MULTI): ICD-10-CM

## 2024-12-02 DIAGNOSIS — E66.01 MORBID OBESITY WITH BMI OF 45.0-49.9, ADULT (MULTI): ICD-10-CM

## 2024-12-02 DIAGNOSIS — I10 HYPERTENSION, ESSENTIAL, BENIGN: ICD-10-CM

## 2024-12-02 DIAGNOSIS — R60.0 EDEMA OF BOTH LEGS: Primary | ICD-10-CM

## 2024-12-02 PROCEDURE — 99214 OFFICE O/P EST MOD 30 MIN: CPT | Performed by: FAMILY MEDICINE

## 2024-12-02 RX ORDER — METOLAZONE 5 MG/1
5 TABLET ORAL DAILY
Qty: 30 TABLET | Refills: 0 | Status: SHIPPED | OUTPATIENT
Start: 2024-12-02 | End: 2025-12-02

## 2024-12-02 NOTE — PROGRESS NOTES
Subjective   Patient ID: Bren Carson is a 59 y.o. female who presents for Leg Swelling (Not sleeping, sob, bilateral leg swelling that she feels in her thighs and belly now. Has been on the lasix 7 days now. ).  HPI  Patient comes in today for evaluation of leg swelling and shortness of breath.  She states she has been on Lasix the last 7 days and it is not having much of an effect.  She has gained 8 pounds since she was here less than a month ago.  She has Mounjaro ordered but with prior authorization etc. she is just picking it up today.  She has had a stable weight gain before and we placed her on metolazone with good success.  In listening to her heart today it is fast and at times irregular.  An EKG was done and no irregularities were found.  Suspect PVCs or PACs.    11/12/2024  Patient comes in today for follow-up on her Rybelsus medication.  She was taking Rybelsus for her blood sugar as well as for weight loss and it initially helped her to lose weight however she was having a lot of gastric upset and she was not sure where it was coming from.  We discussed this at her last visit and thought sulfasalazine or methotrexate may be irritating her stomach but she ran out of the sulfasalazine and was off the methotrexate for a week and she was still having the stomach turmoil.  She stopped the Rybelsus last Friday and her stomach has been feeling better since.  Will have her remain off the Rybelsus at this time.  She had been on Trulicity prior to the Rybelsus but was not losing any weight with it.    She does not have any taste or smell.  That has been going on since 2017.  We have had her see several specialists about this and they told her this idiopathic and they were not sure for if it would ever come back.    Patient is also feeling depressed.  There is a lot of stress at work as she feels like they are trying to push out the older workers.  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  There are also issues at home.  Her  is semiretired and there are projects that need to be done around the house that are costly but still need to be done and she is frustrated because they are procrastinating and are not getting done.  She feels like she already takes enough medicine and does not want to take another \"pill\" at this point and would like to talk to someone about it.    Patient is also frustrated over her inability to lose weight although she has lost 34 pounds since December 2023.  Since starting the Rybelsus however she has only lost about 9 pounds.    Review of Systems   All other systems reviewed and are negative.      Objective   Vitals:  /83   Pulse 90   Temp 36.4 °C (97.5 °F)   Resp 20   Wt 108 kg (238 lb)   LMP  (LMP Unknown)   SpO2 95%   BMI 46.48 kg/m²     Physical Exam  Vitals reviewed.   Constitutional:       Appearance: She is morbidly obese.   HENT:      Head: Normocephalic and atraumatic.      Right Ear: Tympanic membrane, ear canal and external ear normal.      Left Ear: Tympanic membrane, ear canal and external ear normal.      Nose: Nose normal.      Mouth/Throat:      Mouth: Mucous membranes are moist.      Pharynx: Oropharynx is clear.   Eyes:      Extraocular Movements: Extraocular movements intact.      Conjunctiva/sclera: Conjunctivae normal.      Pupils: Pupils are equal, round, and reactive to light.   Cardiovascular:      Rate and Rhythm: Normal rate and regular rhythm.      Heart sounds: Normal heart sounds. No murmur heard.  Pulmonary:      Effort: Pulmonary effort is normal.      Breath sounds: Normal breath sounds. No wheezing.   Abdominal:      General: Abdomen is flat. Bowel sounds are normal.      Palpations: Abdomen is soft.   Musculoskeletal:         General: Normal range of motion.      Right lower leg: Edema (2-3+ pitting) present.      Left lower leg: Edema (2-3+ pitting) present. "   Skin:     General: Skin is warm and dry.   Neurological:      General: No focal deficit present.      Mental Status: She is alert and oriented to person, place, and time. Mental status is at baseline.   Psychiatric:         Mood and Affect: Mood normal.         Behavior: Behavior normal.         Thought Content: Thought content normal.         Judgment: Judgment normal.         Assessment/Plan   Problem List Items Addressed This Visit       Diabetes mellitus, type 2 (Multi)    Edema of both legs - Primary    Relevant Medications    metOLazone (Zaroxolyn) 5 mg tablet    Other Relevant Orders    Referral to Cardiology    Follow Up In [facility] - PCP - Established    Hypertension, essential, benign    Morbid obesity with BMI of 45.0-49.9, adult (Multi)   Take new medication as directed.  Continue other medications as directed.  RTC in one week for recheck, sooner should problems arise.  Patient to be out of work today through 12/4/2024  Medication list reconciled.  Thank you for visiting today!      Professional services: Some of this note was completed using Dragon voice technology and sometimes the software misinterprets words. This may include unintended errors with respect to translation of words, typographical errors or grammar errors which may not have been identified prior to finalization of the chart note. Please take this into account when reading the note. Thank you.       Octavio Garcia,  12/06/24 6:36 AM

## 2024-12-05 ENCOUNTER — TELEPHONE (OUTPATIENT)
Dept: PRIMARY CARE | Facility: CLINIC | Age: 59
End: 2024-12-05
Payer: COMMERCIAL

## 2024-12-05 NOTE — TELEPHONE ENCOUNTER
Pt requesting work excuse for 12/4, was told to notify PCP office if she was unable to make it to work.     Can be faxed to [phone] attn Dania Schuster

## 2024-12-05 NOTE — TELEPHONE ENCOUNTER
Patient called in this morning stating that she was put on a second diuretic and she has been taken them both in the morning.  Patient states that she is not losing the water retention as fast as she thought she would.  Patient is asking if she is able to take the two pills at a different time instead of the same time?  Patient can be reached at [phone].        Thank You

## 2024-12-05 NOTE — TELEPHONE ENCOUNTER
I called patient and only got her answering machine.  I left a message instructing her to increase her Lasix to 80 mg daily and continue the metolazone at 5 mg daily until I see her next week.

## 2024-12-09 ENCOUNTER — APPOINTMENT (OUTPATIENT)
Dept: PRIMARY CARE | Facility: CLINIC | Age: 59
End: 2024-12-09
Payer: COMMERCIAL

## 2024-12-09 ENCOUNTER — LAB (OUTPATIENT)
Dept: LAB | Facility: LAB | Age: 59
End: 2024-12-09
Payer: COMMERCIAL

## 2024-12-09 VITALS
SYSTOLIC BLOOD PRESSURE: 114 MMHG | DIASTOLIC BLOOD PRESSURE: 80 MMHG | BODY MASS INDEX: 41.91 KG/M2 | TEMPERATURE: 97.5 F | WEIGHT: 214.6 LBS | HEART RATE: 101 BPM | RESPIRATION RATE: 18 BRPM

## 2024-12-09 DIAGNOSIS — R60.0 EDEMA OF BOTH LEGS: ICD-10-CM

## 2024-12-09 DIAGNOSIS — I10 HYPERTENSION, ESSENTIAL, BENIGN: ICD-10-CM

## 2024-12-09 DIAGNOSIS — R60.0 EDEMA OF BOTH LEGS: Primary | ICD-10-CM

## 2024-12-09 DIAGNOSIS — E11.9 TYPE 2 DIABETES MELLITUS WITHOUT COMPLICATION, WITHOUT LONG-TERM CURRENT USE OF INSULIN (MULTI): ICD-10-CM

## 2024-12-09 DIAGNOSIS — E66.01 MORBID OBESITY WITH BMI OF 40.0-44.9, ADULT (MULTI): ICD-10-CM

## 2024-12-09 LAB
ANION GAP SERPL CALC-SCNC: 16 MMOL/L (ref 10–20)
BUN SERPL-MCNC: 26 MG/DL (ref 6–23)
CALCIUM SERPL-MCNC: 9.8 MG/DL (ref 8.6–10.3)
CHLORIDE SERPL-SCNC: 92 MMOL/L (ref 98–107)
CO2 SERPL-SCNC: 34 MMOL/L (ref 21–32)
CREAT SERPL-MCNC: 0.83 MG/DL (ref 0.5–1.05)
EGFRCR SERPLBLD CKD-EPI 2021: 81 ML/MIN/1.73M*2
GLUCOSE SERPL-MCNC: 117 MG/DL (ref 74–99)
POTASSIUM SERPL-SCNC: 3.8 MMOL/L (ref 3.5–5.3)
SODIUM SERPL-SCNC: 138 MMOL/L (ref 136–145)

## 2024-12-09 PROCEDURE — 99213 OFFICE O/P EST LOW 20 MIN: CPT | Performed by: FAMILY MEDICINE

## 2024-12-09 PROCEDURE — 36415 COLL VENOUS BLD VENIPUNCTURE: CPT

## 2024-12-09 PROCEDURE — 80048 BASIC METABOLIC PNL TOTAL CA: CPT

## 2024-12-09 NOTE — PROGRESS NOTES
Subjective   Patient ID: Bren Carson is a 59 y.o. female who presents for Follow-up (Edema- doing better).    HPI  Today for 1 week follow-up on her water retention/edema.  She has lost 24 pounds since she was here last week with the diuretics.  She is feeling much better.  She is no longer short of breath and she has no edema currently on her legs.  Patient also started her Mounjaro last week and is due for her second shot.  So far she seems to be tolerating it well.    Review of Systems   All other systems reviewed and are negative.      Objective   Vitals:  /80   Pulse 101   Temp 36.4 °C (97.5 °F)   Resp 18   Wt 97.3 kg (214 lb 9.6 oz)   LMP  (LMP Unknown)   BMI 41.91 kg/m²     Physical Exam  Vitals reviewed.   Constitutional:       Appearance: She is morbidly obese.   HENT:      Head: Normocephalic and atraumatic.      Right Ear: Tympanic membrane, ear canal and external ear normal.      Left Ear: Tympanic membrane, ear canal and external ear normal.      Nose: Nose normal.      Mouth/Throat:      Mouth: Mucous membranes are moist.      Pharynx: Oropharynx is clear.   Eyes:      Extraocular Movements: Extraocular movements intact.      Conjunctiva/sclera: Conjunctivae normal.      Pupils: Pupils are equal, round, and reactive to light.   Cardiovascular:      Rate and Rhythm: Normal rate and regular rhythm.      Heart sounds: Normal heart sounds. No murmur heard.  Pulmonary:      Effort: Pulmonary effort is normal.      Breath sounds: Normal breath sounds. No wheezing.   Abdominal:      General: Abdomen is flat. Bowel sounds are normal.      Palpations: Abdomen is soft.   Musculoskeletal:         General: Normal range of motion.      Right lower leg: No edema.      Left lower leg: No edema.   Skin:     General: Skin is warm and dry.   Neurological:      General: No focal deficit present.      Mental Status: She is alert and oriented to person, place, and time. Mental status is at baseline.    Psychiatric:         Mood and Affect: Mood normal.         Behavior: Behavior normal.         Thought Content: Thought content normal.         Judgment: Judgment normal.       Assessment/Plan   Problem List Items Addressed This Visit       Diabetes mellitus, type 2 (Multi)    Edema of both legs - Primary    Relevant Orders    Basic Metabolic Panel (Completed)    Hypertension, essential, benign    Morbid obesity with BMI of 40.0-44.9, adult (Multi)   Will contact patient with lab results when available.  DC metolazone  Continue Lasix  Follow-up with cardiology as scheduled.  Medication list reconciled.  Thank you for visiting today!      Professional services: Some of this note was completed using Dragon voice technology and sometimes the software misinterprets words. This may include unintended errors with respect to translation of words, typographical errors or grammar errors which may not have been identified prior to finalization of the chart note. Please take this into account when reading the note. Thank you.

## 2024-12-11 NOTE — RESULT ENCOUNTER NOTE
Hi Bren,    Your labs look okay but your potassium is starting to get low.  Since we still have you on the Lasix water pill I want you to continue the potassium 20 mEq daily for now.  Let me know if you need more.    Have a Great Day and Happy Holidays!!!    Dr. Garcia

## 2024-12-16 ENCOUNTER — APPOINTMENT (OUTPATIENT)
Dept: PHARMACY | Facility: HOSPITAL | Age: 59
End: 2024-12-16
Payer: COMMERCIAL

## 2024-12-16 DIAGNOSIS — E11.9 TYPE 2 DIABETES MELLITUS WITHOUT COMPLICATION, WITHOUT LONG-TERM CURRENT USE OF INSULIN (MULTI): ICD-10-CM

## 2024-12-16 DIAGNOSIS — E66.01 MORBID OBESITY WITH BMI OF 40.0-44.9, ADULT (MULTI): ICD-10-CM

## 2024-12-16 PROBLEM — I89.0 LYMPHEDEMA: Status: ACTIVE | Noted: 2024-12-16

## 2024-12-16 PROBLEM — E78.00 HYPERCHOLESTEROLEMIA: Status: RESOLVED | Noted: 2023-03-17 | Resolved: 2024-12-16

## 2024-12-16 PROBLEM — I50.9 ACUTE CONGESTIVE HEART FAILURE: Status: ACTIVE | Noted: 2024-12-16

## 2024-12-16 PROBLEM — Z83.3 FAMILY HISTORY OF DIABETES MELLITUS (DM): Status: RESOLVED | Noted: 2023-03-22 | Resolved: 2024-12-16

## 2024-12-16 PROBLEM — R03.0 ELEVATED BLOOD PRESSURE READING: Status: RESOLVED | Noted: 2023-03-17 | Resolved: 2024-12-16

## 2024-12-16 RX ORDER — TIRZEPATIDE 5 MG/.5ML
5 INJECTION, SOLUTION SUBCUTANEOUS WEEKLY
Qty: 2 ML | Refills: 1 | Status: SHIPPED | OUTPATIENT
Start: 2024-12-16

## 2024-12-16 NOTE — PROGRESS NOTES
"Subjective     Patient ID: Bren Carson is a 59 y.o. female who presents for diabetes management.    HPI    Allergies   Allergen Reactions    Doxycycline GI Upset     Severe GI upset    Amitriptyline Other     Reactions: \"Knocked me out\"    Claritin [Loratadine] Unknown    Crestor [Rosuvastatin] Unknown    Levaquin [Levofloxacin] Unknown    Pantoprazole Unknown    Prilosec [Omeprazole] Unknown    Rybelsus [Semaglutide] Other     Stomach cramping, diarrhea and emesis    Simvastatin Unknown     Objective     Current DM Pharmacotherapy:   - Mounjaro 2.5 mg weekly    SECONDARY PREVENTION  - Statin? No  - ACE-I/ARB? No  - Aspirin? No    Pertinent PMH Review:  - PMH of Pancreatitis: No  - PMH/FH of Medullary Thyroid Cancer: No  - PMH of Retinopathy: No  - PMH of Urinary Tract Infections: No    Lab Review  Lab Results   Component Value Date    BILITOT 0.4 02/26/2024    CALCIUM 9.8 12/09/2024    CO2 34 (H) 12/09/2024    CL 92 (L) 12/09/2024    CREATININE 0.83 12/09/2024    GLUCOSE 117 (H) 12/09/2024    ALKPHOS 70 02/26/2024    K 3.8 12/09/2024    PROT 6.8 02/26/2024     12/09/2024    AST 18 02/26/2024    ALT 16 02/26/2024    BUN 26 (H) 12/09/2024    ANIONGAP 16 12/09/2024    MG 1.80 11/16/2022    ALBUMIN 4.0 02/26/2024    GFRF >90 03/21/2023     No results found for: \"TRIG\", \"CHOL\", \"LDL\", \"LDLCALC\", \"HDL\"  Lab Results   Component Value Date    HGBA1C 5.6 11/12/2024     The ASCVD Risk score (Bushra DK, et al., 2019) failed to calculate for the following reasons:    Cannot find a previous HDL lab    Cannot find a previous total cholesterol lab    Assessment/Plan     Problem List Items Addressed This Visit       Diabetes mellitus, type 2 (Multi)    Morbid obesity with BMI of 40.0-44.9, adult (Multi)   Patient had no issues with starting Mounjaro. She does not test and she has not noticed any weight loss due to the medication. Will increase Mounjaro to 5 mg weekly to help with weight loss and BG control. Will " follow up in 4 weeks.    Type 2 diabetes mellitus, is at goal. <7%    INCREASE Mounjaro to 5 mg weekly  Follow up: I recommend diabetes care be 4 weeks.    Felicia Gonzalez, PharmD    Continue all meds under the continuation of care with the referring provider and clinical pharmacy team

## 2024-12-17 ENCOUNTER — TELEPHONE (OUTPATIENT)
Dept: PRIMARY CARE | Facility: CLINIC | Age: 59
End: 2024-12-17
Payer: COMMERCIAL

## 2024-12-17 NOTE — TELEPHONE ENCOUNTER
Patient called in this morning stating that she has gained 7 pounds.  Patient can be reached at [phone].      Thank You

## 2024-12-17 NOTE — TELEPHONE ENCOUNTER
I spoke with patient and will have her take metolazone 5 mg daily for the next 3 days.  She is to call with an update on Friday.

## 2024-12-20 NOTE — TELEPHONE ENCOUNTER
Good, she should weigh herself every morning and if she gains more than 3 pounds from the day before she should take a metolazone pill that morning.  She should do this until she sees cardiology at the end of the month.

## 2024-12-20 NOTE — TELEPHONE ENCOUNTER
Patient called in this morning with an update.  Patient states that she lost the weight following the doctors instructions.  Patient states that she is going to go back down to only take one of the lasix's.  Patient can be reached at [phone].      Thank You

## 2024-12-26 ENCOUNTER — TELEPHONE (OUTPATIENT)
Dept: PRIMARY CARE | Facility: CLINIC | Age: 59
End: 2024-12-26
Payer: COMMERCIAL

## 2024-12-26 NOTE — TELEPHONE ENCOUNTER
Patient states that she fell last Thursday and used her hand to brace the fall and she states that she is having some pain and is asking for an order to have a xray done.  Patient can be reaced at [phone].        Thank You

## 2024-12-27 ENCOUNTER — TELEMEDICINE (OUTPATIENT)
Dept: PRIMARY CARE | Facility: CLINIC | Age: 59
End: 2024-12-27
Payer: COMMERCIAL

## 2024-12-27 ENCOUNTER — HOSPITAL ENCOUNTER (OUTPATIENT)
Dept: RADIOLOGY | Facility: EXTERNAL LOCATION | Age: 59
Discharge: HOME | End: 2024-12-27

## 2024-12-27 VITALS — WEIGHT: 210 LBS | HEIGHT: 62 IN | BODY MASS INDEX: 38.64 KG/M2

## 2024-12-27 DIAGNOSIS — Z53.20 STATIN MEDICATION DECLINED BY PATIENT: ICD-10-CM

## 2024-12-27 DIAGNOSIS — M25.532 LEFT WRIST PAIN: ICD-10-CM

## 2024-12-27 DIAGNOSIS — E11.9 TYPE 2 DIABETES MELLITUS WITHOUT COMPLICATION, WITHOUT LONG-TERM CURRENT USE OF INSULIN (MULTI): ICD-10-CM

## 2024-12-27 DIAGNOSIS — M25.532 LEFT WRIST PAIN: Primary | ICD-10-CM

## 2024-12-27 DIAGNOSIS — E66.9 OBESITY (BMI 35.0-39.9 WITHOUT COMORBIDITY): ICD-10-CM

## 2024-12-27 PROCEDURE — 3008F BODY MASS INDEX DOCD: CPT | Performed by: FAMILY MEDICINE

## 2024-12-27 PROCEDURE — 99213 OFFICE O/P EST LOW 20 MIN: CPT | Performed by: FAMILY MEDICINE

## 2024-12-27 NOTE — PROGRESS NOTES
"Subjective   Patient ID: Bren Carson is a 59 y.o. female who presents for Hand Pain (Patient is having left hand pain. She was bakcing up to move out of her husbands way on Sunday, tripped and braced her fall with her hands.  No brusing, no lacerations, some mild swelling. ).  HPI  Patient is seen today with an interactive audio and video telecommunication system which permits real time communications between the patient (at the originating site) and provider (at the distant site) to provide this telehealth service.  The patient was informed about the telehealth clinical encounter including benefits to avoiding travel and limitations to the assessment.  In person care may be recommended if needed.  Telehealth sessions are not being recorded and personal health information is protected.  Verbal consent was requested and obtained from Bren on this date, 12/27/2024 for a telehealth visit.    Patient presents today with a sore left wrist.  As noted above she fell backward over some packages that she was unloading from her car on Sunday and landed onto her left hand and wrist.  She had immediate pain from the fall but did not seek any medical attention until now.  She states she was busy Sunday and Monday with baking and wrapping gifts but then got to a point where she could not do it anymore and her  had to take over.    She describes her pain right now as a 3-8 out of 10 depending on her activities.    Review of Systems   All other systems reviewed and are negative.      Objective   Vitals:  Ht 1.562 m (5' 1.5\")   Wt 95.3 kg (210 lb)   LMP  (LMP Unknown)   BMI 39.04 kg/m²     Physical Exam    Assessment/Plan   Problem List Items Addressed This Visit       Diabetes mellitus, type 2 (Multi)    Statin medication declined by patient    Obesity (BMI 35.0-39.9 without comorbidity)     Other Visit Diagnoses       Left wrist pain    -  Primary    Relevant Orders    XR wrist left 3+ views (Completed)      "   X-ray left wrist  Will contact patient with x-ray results when they are available.  Use wrist splint for the next 5 days  Soak left wrist in warm water with Epsom salts for 10-15 minutes 2-3 times a day for the next 7-10 days.   Use Advil/Aleve or Tylenol as directed for pain and swelling.  RTC in 10 days for recheck if not better, sooner if condition worsens.  Medication list reconciled.  Due to the novel SARS-CoV-2 outbreak causing the corona virus disease of 2019 (nicknamed COVID-19) which has been declared a pandemic, we have accommodated this patient to be evaluated via telemedicine. All documentation here reflects my evaluation as well as our plan for today's visit.       Professional services: Some of this note was completed using Dragon voice technology and sometimes the software misinterprets words. This may include unintended errors with respect to translation of words, typographical errors or grammar errors which may not have been identified prior to finalization of the chart note. Please take this into account when reading the note. Thank you.       Octavio Garcia DO 12/28/24 10:06 AM

## 2024-12-30 ENCOUNTER — OFFICE VISIT (OUTPATIENT)
Dept: CARDIOLOGY | Facility: CLINIC | Age: 59
End: 2024-12-30
Payer: COMMERCIAL

## 2024-12-30 VITALS
SYSTOLIC BLOOD PRESSURE: 114 MMHG | BODY MASS INDEX: 39.65 KG/M2 | WEIGHT: 210 LBS | OXYGEN SATURATION: 96 % | HEIGHT: 61 IN | HEART RATE: 52 BPM | DIASTOLIC BLOOD PRESSURE: 80 MMHG

## 2024-12-30 DIAGNOSIS — E78.2 HYPERLIPIDEMIA, MIXED: ICD-10-CM

## 2024-12-30 DIAGNOSIS — R94.31 ABNORMAL EKG: ICD-10-CM

## 2024-12-30 DIAGNOSIS — I49.1 PAC (PREMATURE ATRIAL CONTRACTION): ICD-10-CM

## 2024-12-30 DIAGNOSIS — I50.32 CHRONIC DIASTOLIC CONGESTIVE HEART FAILURE: Primary | ICD-10-CM

## 2024-12-30 DIAGNOSIS — I10 HYPERTENSION, ESSENTIAL, BENIGN: ICD-10-CM

## 2024-12-30 DIAGNOSIS — R00.0 SINUS TACHYCARDIA: ICD-10-CM

## 2024-12-30 DIAGNOSIS — R60.0 EDEMA OF BOTH LEGS: ICD-10-CM

## 2024-12-30 PROBLEM — Z53.20 STATIN MEDICATION DECLINED BY PATIENT: Status: RESOLVED | Noted: 2024-12-27 | Resolved: 2024-12-30

## 2024-12-30 PROBLEM — R79.89 ELEVATED TROPONIN LEVEL NOT DUE MYOCARDIAL INFARCTION: Status: RESOLVED | Noted: 2023-03-17 | Resolved: 2024-12-30

## 2024-12-30 PROBLEM — I50.30 DIASTOLIC CONGESTIVE HEART FAILURE: Status: ACTIVE | Noted: 2024-12-30

## 2024-12-30 PROBLEM — I49.9 CARDIAC ARRHYTHMIA: Status: RESOLVED | Noted: 2024-11-12 | Resolved: 2024-12-30

## 2024-12-30 PROBLEM — I50.9 ACUTE CONGESTIVE HEART FAILURE: Status: RESOLVED | Noted: 2024-12-16 | Resolved: 2024-12-30

## 2024-12-30 PROCEDURE — 3074F SYST BP LT 130 MM HG: CPT | Performed by: INTERNAL MEDICINE

## 2024-12-30 PROCEDURE — 3079F DIAST BP 80-89 MM HG: CPT | Performed by: INTERNAL MEDICINE

## 2024-12-30 PROCEDURE — 99213 OFFICE O/P EST LOW 20 MIN: CPT | Performed by: INTERNAL MEDICINE

## 2024-12-30 PROCEDURE — 1036F TOBACCO NON-USER: CPT | Performed by: INTERNAL MEDICINE

## 2024-12-30 PROCEDURE — 3008F BODY MASS INDEX DOCD: CPT | Performed by: INTERNAL MEDICINE

## 2024-12-30 RX ORDER — TORSEMIDE 10 MG/1
10 TABLET ORAL DAILY
Qty: 90 TABLET | Refills: 3 | Status: SHIPPED | OUTPATIENT
Start: 2024-12-30 | End: 2025-12-30

## 2024-12-30 ASSESSMENT — ENCOUNTER SYMPTOMS: DYSPNEA ON EXERTION: 1

## 2024-12-30 NOTE — PROGRESS NOTES
Subjective   Bren Carson is a 59 y.o. female.    Chief Complaint:  Cardiac evaluation.  Lower extremity edema.  Exertional dyspnea.  Abnormal EKG.    HPI    This is a 59-year-old woman who presents to us for cardiac evaluation.  Her chief complaint is that the recent onset of lower extremity edema.  This began several months ago.  She has had a vein ablation procedure which temporarily improved the edema but now has become worse.  On diuretic therapy.  Also complains of abdominal fullness and abdominal swelling.    Her past cardiac history is unremarkable.  No history of hypertension.  No history of an acute coronary event.  Reports normal cholesterol levels.    Past medical history: Significant for degenerative arthritis and osteoarthritis.  Carries a diagnosis of seronegative rheumatoid arthritis.  History of colitis.  History of gastroesophageal reflux disease.  History of left total knee replacement.    Allergies to medications: Amitriptyline, doxycycline, rosuvastatin, Claritin, morphine, Levaquin, Protonix, Prilosec, Rybelsus, simvastatin.    Social history: She is a non-smoker and has never smoked.  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   with 2 children.    Family history: Mother  of congestive heart failure at the age of 80.  Father  during a coronary artery bypass grafting procedure.    Review of Systems   Cardiovascular:  Positive for dyspnea on exertion and leg swelling.   Musculoskeletal:  Positive for arthritis and joint pain.       Current Outpatient Medications   Medication Sig Dispense Refill    celecoxib (CeleBREX) 200 mg capsule Take 1 capsule (200 mg) by mouth 2 times a day.      folic acid (Folvite) 1 mg tablet Take by mouth.      furosemide (Lasix) 40 mg tablet Take 1 tablet (40 mg) by mouth once daily. As needed leg swelling 90 tablet 0    hydroxychloroquine (Plaquenil) 200 mg tablet Take 1 tablet (200 mg) by mouth twice a  "day.      Klor-Con M20 20 mEq ER tablet TAKE 1 TABLET (20 MEQ) BY MOUTH ONCE DAILY. DO NOT CRUSH, CHEW, OR SPLIT. 30 tablet 2    METHOTREXATE SODIUM INJ 1 GM Injection Solution Reconstituted; Use as directed.      metOLazone (Zaroxolyn) 5 mg tablet Take 1 tablet (5 mg) by mouth once daily. 30 tablet 0    multivitamin tablet Take by mouth.      NON FORMULARY Take 1 each by mouth once daily. Potassium 99 MG Oral Tablet; As directed.      Rasuvo, PF, 25 mg/0.5 mL auto-injector       sulfaSALAzine (Azulfidine) 500 mg tablet 500mg once/day x1 week, then 500mg twice/day thereafter      tirzepatide (Mounjaro) 5 mg/0.5 mL pen injector Inject 5 mg under the skin 1 (one) time per week. 2 mL 1    omeprazole (PriLOSEC) 40 mg DR capsule Take 1 capsule (40 mg) by mouth once daily. 90 capsule 1    torsemide (Demadex) 10 mg tablet Take 1 tablet (10 mg) by mouth once daily. 90 tablet 3     No current facility-administered medications for this visit.        Visit Vitals  /80 (BP Location: Left arm)   Pulse 52   Ht 1.549 m (5' 1\")   Wt 95.3 kg (210 lb)   LMP  (LMP Unknown)   SpO2 96%   BMI 39.68 kg/m²   OB Status Postmenopausal   Smoking Status Never   BSA 2.02 m²        Objective     Constitutional:       Appearance: Not in distress.   Neck:      Vascular: JVD normal.   Pulmonary:      Breath sounds: Normal breath sounds.   Cardiovascular:      Normal rate. Regular rhythm. Normal S1. Normal S2.       Murmurs: There is no murmur.      No gallop.    Pulses:     Intact distal pulses.   Edema:     Peripheral edema present.     Pretibial: bilateral 1+ edema of the pretibial area.  Abdominal:      General: There is no distension.      Palpations: Abdomen is soft.   Neurological:      Mental Status: Alert.         Lab Review:   Lab Results   Component Value Date     12/09/2024    K 3.8 12/09/2024    CL 92 (L) 12/09/2024    CO2 34 (H) 12/09/2024    BUN 26 (H) 12/09/2024    CREATININE 0.83 12/09/2024    GLUCOSE 117 (H) 12/09/2024 "    CALCIUM 9.8 12/09/2024       Assessment:    1.  Lower extremity edema.  Etiology is unclear.  Will do an echocardiographic study to evaluate her right ventricular and left ventricular function.    2.  Abnormal EKG.  EKG demonstrates diffuse ST-T changes with a left anterior fascicular block.  Poor R wave progression.    3.  Coronary artery disease risk factors.  A CT of the chest done in November 2022 demonstrated no evidence of atherosclerosis in the distribution of the coronary arteries or in the aorta.    4.  Hyperlipidemia.  Cholesterol 177, HDL 48, .  Given that she does not have coronary or vascular atherosclerosis does not need aggressive treatment for her hyperlipidemia.

## 2024-12-30 NOTE — PATIENT INSTRUCTIONS
Stop furosemide    Stop metolazone    Start torsemide 10 mg daily    We will schedule you for an echocardiogram study and an office visit.

## 2025-01-02 ENCOUNTER — TELEPHONE (OUTPATIENT)
Dept: PRIMARY CARE | Facility: CLINIC | Age: 60
End: 2025-01-02
Payer: COMMERCIAL

## 2025-01-06 ENCOUNTER — TELEPHONE (OUTPATIENT)
Dept: CARDIOLOGY | Facility: CLINIC | Age: 60
End: 2025-01-06
Payer: COMMERCIAL

## 2025-01-06 NOTE — TELEPHONE ENCOUNTER
Pt states that Dr. Palmer recently changed her diuretic and she is up 9lbs and can feel that she is retaining water in her abdomen. She feels a little SOB and fatigued. She has a fuv on Friday but wants to know if she can have her diuretic increased until then.

## 2025-01-06 NOTE — TELEPHONE ENCOUNTER
Called pt to discuss. Pt states she started Torsemide 10mg daily as instructed. Pt states she can feel fluid building up in both thighs and abdomen. Pt states she did not start swelling in legs yet. Pt c/o slight increased SOB w/activity and weight up 9 lbs since Tosemide started on 12/30/24.    Last BMP 12/9/24: B/C 26, 0.83, K+3.8    Pt has ov with Dr. Palmer on Fri. 1/10/25 with echo.    Please advise. Thanks.

## 2025-01-10 ENCOUNTER — HOSPITAL ENCOUNTER (OUTPATIENT)
Dept: CARDIOLOGY | Facility: CLINIC | Age: 60
Discharge: HOME | End: 2025-01-10
Payer: COMMERCIAL

## 2025-01-10 ENCOUNTER — LAB (OUTPATIENT)
Dept: LAB | Facility: LAB | Age: 60
End: 2025-01-10
Payer: COMMERCIAL

## 2025-01-10 ENCOUNTER — OFFICE VISIT (OUTPATIENT)
Dept: CARDIOLOGY | Facility: CLINIC | Age: 60
End: 2025-01-10
Payer: COMMERCIAL

## 2025-01-10 VITALS
SYSTOLIC BLOOD PRESSURE: 118 MMHG | WEIGHT: 210 LBS | OXYGEN SATURATION: 92 % | BODY MASS INDEX: 39.65 KG/M2 | DIASTOLIC BLOOD PRESSURE: 84 MMHG | HEART RATE: 112 BPM | HEIGHT: 61 IN

## 2025-01-10 VITALS — BODY MASS INDEX: 39.68 KG/M2 | HEIGHT: 61 IN

## 2025-01-10 DIAGNOSIS — M45.9 ANKYLOSING SPONDYLITIS, UNSPECIFIED SITE OF SPINE (MULTI): ICD-10-CM

## 2025-01-10 DIAGNOSIS — I50.32 CHRONIC DIASTOLIC CONGESTIVE HEART FAILURE: ICD-10-CM

## 2025-01-10 DIAGNOSIS — I50.22 CHRONIC SYSTOLIC HEART FAILURE: ICD-10-CM

## 2025-01-10 DIAGNOSIS — I50.22 CHRONIC SYSTOLIC CONGESTIVE HEART FAILURE, NYHA CLASS 2: Primary | ICD-10-CM

## 2025-01-10 DIAGNOSIS — R60.0 EDEMA OF BOTH LEGS: ICD-10-CM

## 2025-01-10 DIAGNOSIS — M06.9 RHEUMATOID ARTHRITIS, INVOLVING UNSPECIFIED SITE, UNSPECIFIED WHETHER RHEUMATOID FACTOR PRESENT (MULTI): ICD-10-CM

## 2025-01-10 DIAGNOSIS — I10 HYPERTENSION, ESSENTIAL, BENIGN: ICD-10-CM

## 2025-01-10 PROBLEM — E66.01 MORBID OBESITY WITH BMI OF 40.0-44.9, ADULT (MULTI): Status: RESOLVED | Noted: 2023-03-17 | Resolved: 2025-01-10

## 2025-01-10 PROBLEM — I34.0 MODERATE MITRAL REGURGITATION: Status: ACTIVE | Noted: 2025-01-10

## 2025-01-10 PROBLEM — I50.30 DIASTOLIC CONGESTIVE HEART FAILURE: Status: RESOLVED | Noted: 2024-12-30 | Resolved: 2025-01-10

## 2025-01-10 PROBLEM — E66.812 CLASS 2 OBESITY WITH BODY MASS INDEX (BMI) OF 39.0 TO 39.9 IN ADULT: Status: ACTIVE | Noted: 2024-12-27

## 2025-01-10 LAB
ANION GAP SERPL CALC-SCNC: 11 MMOL/L (ref 10–20)
AORTIC VALVE MEAN GRADIENT: 6 MMHG
AORTIC VALVE PEAK VELOCITY: 1.71 M/S
AV PEAK GRADIENT: 12 MMHG
AVA (PEAK VEL): 1.38 CM2
AVA (VTI): 1.44 CM2
BUN SERPL-MCNC: 18 MG/DL (ref 6–23)
CALCIUM SERPL-MCNC: 9.1 MG/DL (ref 8.6–10.3)
CHLORIDE SERPL-SCNC: 98 MMOL/L (ref 98–107)
CO2 SERPL-SCNC: 35 MMOL/L (ref 21–32)
CREAT SERPL-MCNC: 0.65 MG/DL (ref 0.5–1.05)
EGFRCR SERPLBLD CKD-EPI 2021: >90 ML/MIN/1.73M*2
EJECTION FRACTION APICAL 4 CHAMBER: 25.1
EJECTION FRACTION: 25 %
ERYTHROCYTE [DISTWIDTH] IN BLOOD BY AUTOMATED COUNT: 14.3 % (ref 11.5–14.5)
GLUCOSE SERPL-MCNC: 81 MG/DL (ref 74–99)
HCT VFR BLD AUTO: 44.7 % (ref 36–46)
HGB BLD-MCNC: 14.7 G/DL (ref 12–16)
INR PPP: 1.1 (ref 0.9–1.1)
LEFT VENTRICLE INTERNAL DIMENSION DIASTOLE: 5.95 CM (ref 3.5–6)
LEFT VENTRICULAR OUTFLOW TRACT DIAMETER: 2.01 CM
MCH RBC QN AUTO: 30.5 PG (ref 26–34)
MCHC RBC AUTO-ENTMCNC: 32.9 G/DL (ref 32–36)
MCV RBC AUTO: 93 FL (ref 80–100)
MITRAL VALVE E/A RATIO: 1.49
NRBC BLD-RTO: 0 /100 WBCS (ref 0–0)
PLATELET # BLD AUTO: 213 X10*3/UL (ref 150–450)
POTASSIUM SERPL-SCNC: 3.4 MMOL/L (ref 3.5–5.3)
PROTHROMBIN TIME: 11.9 SECONDS (ref 9.8–12.8)
RBC # BLD AUTO: 4.82 X10*6/UL (ref 4–5.2)
RIGHT VENTRICLE FREE WALL PEAK S': 9 CM/S
RIGHT VENTRICLE PEAK SYSTOLIC PRESSURE: 61.3 MMHG
SODIUM SERPL-SCNC: 141 MMOL/L (ref 136–145)
TRICUSPID ANNULAR PLANE SYSTOLIC EXCURSION: 1.8 CM
WBC # BLD AUTO: 7.2 X10*3/UL (ref 4.4–11.3)

## 2025-01-10 PROCEDURE — 2500000004 HC RX 250 GENERAL PHARMACY W/ HCPCS (ALT 636 FOR OP/ED): Performed by: INTERNAL MEDICINE

## 2025-01-10 PROCEDURE — 3079F DIAST BP 80-89 MM HG: CPT | Performed by: INTERNAL MEDICINE

## 2025-01-10 PROCEDURE — 3074F SYST BP LT 130 MM HG: CPT | Performed by: INTERNAL MEDICINE

## 2025-01-10 PROCEDURE — 85610 PROTHROMBIN TIME: CPT

## 2025-01-10 PROCEDURE — 99215 OFFICE O/P EST HI 40 MIN: CPT | Mod: 25 | Performed by: INTERNAL MEDICINE

## 2025-01-10 PROCEDURE — 85027 COMPLETE CBC AUTOMATED: CPT

## 2025-01-10 PROCEDURE — 3008F BODY MASS INDEX DOCD: CPT | Performed by: INTERNAL MEDICINE

## 2025-01-10 PROCEDURE — 4010F ACE/ARB THERAPY RXD/TAKEN: CPT | Performed by: INTERNAL MEDICINE

## 2025-01-10 PROCEDURE — 93306 TTE W/DOPPLER COMPLETE: CPT

## 2025-01-10 PROCEDURE — 99215 OFFICE O/P EST HI 40 MIN: CPT | Performed by: INTERNAL MEDICINE

## 2025-01-10 PROCEDURE — 80048 BASIC METABOLIC PNL TOTAL CA: CPT

## 2025-01-10 RX ORDER — LOSARTAN POTASSIUM 25 MG/1
25 TABLET ORAL DAILY
Qty: 30 TABLET | Refills: 11 | Status: SHIPPED | OUTPATIENT
Start: 2025-01-10 | End: 2026-01-10

## 2025-01-10 RX ORDER — METOPROLOL SUCCINATE 50 MG/1
50 TABLET, EXTENDED RELEASE ORAL DAILY
Qty: 30 TABLET | Refills: 11 | Status: SHIPPED | OUTPATIENT
Start: 2025-01-10 | End: 2026-01-10

## 2025-01-10 RX ORDER — TORSEMIDE 20 MG/1
20 TABLET ORAL DAILY
Qty: 90 TABLET | Refills: 3 | Status: SHIPPED | OUTPATIENT
Start: 2025-01-10 | End: 2026-01-10

## 2025-01-10 RX ADMIN — PERFLUTREN 2 ML OF DILUTION: 6.52 INJECTION, SUSPENSION INTRAVENOUS at 08:53

## 2025-01-10 NOTE — PATIENT INSTRUCTIONS
We need you to get blood work in 2 weeks before your catheterization.    We scheduled you for a CARDIAC MRI    We will schedule you for a right and left heart catheterization

## 2025-01-10 NOTE — LETTER
January 10, 2025     Octavio Garcia DO  [address]  [address]  [facility] [address]    Patient: Bren Carson   YOB: 1965   Date of Visit: 1/10/2025       Dear Dr. Octavio Garcia DO:    Thank you for referring Bren Carson to me for evaluation. Below are my notes for this consultation.  If you have questions, please do not hesitate to call me. I look forward to following your patient along with you.       Sincerely,     Husam Palmer MD      CC: No Recipients  ______________________________________________________________________________________    Subjective  Bren Carson is a 59 y.o. female.    Chief Complaint:  Cardiac follow-up for shortness of breath exertional dyspnea and lower extremity edema.    HPI    She is here for follow-up of her echocardiographic study.  She has had some improvement in her peripheral edema and and her exertional dyspnea.  No anginal symptoms.    Her past cardiac history is unremarkable.  No history of hypertension.  No history of an acute coronary event.  Reports normal cholesterol levels.     Past medical history: Significant for degenerative arthritis and osteoarthritis.  Carries a diagnosis of seronegative rheumatoid arthritis.  History of colitis.  History of gastroesophageal reflux disease.  History of left total knee replacement.     Allergies to medications: Amitriptyline, doxycycline, rosuvastatin, Claritin, morphine, Levaquin, Protonix, Prilosec, Rybelsus, simvastatin.     Social history: She is a non-smoker and has never smoked.  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   with 2 children.     Family history: Mother  of congestive heart failure at the age of 80.  Father  during a coronary artery bypass grafting procedure.     Review of Systems   Cardiovascular:  Positive for dyspnea on exertion and leg swelling.   Musculoskeletal:  Positive for arthritis and joint pain.  "    Current Outpatient Medications   Medication Sig Dispense Refill   • celecoxib (CeleBREX) 200 mg capsule Take 1 capsule (200 mg) by mouth 2 times a day.     • folic acid (Folvite) 1 mg tablet Take by mouth.     • hydroxychloroquine (Plaquenil) 200 mg tablet Take 1 tablet (200 mg) by mouth twice a day.     • Klor-Con M20 20 mEq ER tablet TAKE 1 TABLET (20 MEQ) BY MOUTH ONCE DAILY. DO NOT CRUSH, CHEW, OR SPLIT. 30 tablet 2   • METHOTREXATE SODIUM INJ 1 GM Injection Solution Reconstituted; Use as directed.     • multivitamin tablet Take by mouth.     • NON FORMULARY Take 1 each by mouth once daily. Potassium 99 MG Oral Tablet; As directed.     • Rasuvo, PF, 25 mg/0.5 mL auto-injector      • sulfaSALAzine (Azulfidine) 500 mg tablet 500mg once/day x1 week, then 500mg twice/day thereafter     • tirzepatide (Mounjaro) 5 mg/0.5 mL pen injector Inject 5 mg under the skin 1 (one) time per week. 2 mL 1   • furosemide (Lasix) 40 mg tablet Take 1 tablet (40 mg) by mouth once daily. As needed leg swelling 90 tablet 0   • losartan (Cozaar) 25 mg tablet Take 1 tablet (25 mg) by mouth once daily. 30 tablet 11   • metoprolol succinate XL (Toprol-XL) 50 mg 24 hr tablet Take 1 tablet (50 mg) by mouth once daily. Do not crush or chew. 30 tablet 11   • omeprazole (PriLOSEC) 40 mg DR capsule Take 1 capsule (40 mg) by mouth once daily. 90 capsule 1   • torsemide (Demadex) 20 mg tablet Take 1 tablet (20 mg) by mouth once daily. 90 tablet 3     No current facility-administered medications for this visit.        Visit Vitals  /84 (BP Location: Left arm)   Pulse (!) 112   Ht 1.549 m (5' 1\")   Wt 95.3 kg (210 lb)   LMP  (LMP Unknown)   SpO2 92%   BMI 39.68 kg/m²   OB Status Postmenopausal   Smoking Status Never   BSA 2.02 m²        Objective    Constitutional:       Appearance: Not in distress.   Neck:      Vascular: JVD normal.   Pulmonary:      Breath sounds: Normal breath sounds.   Cardiovascular:      Normal rate. Regular rhythm. " Normal S1. Normal S2.       Murmurs: There is a grade 1/6 systolic murmur.      No gallop.    Pulses:     Intact distal pulses.   Edema:     Peripheral edema absent.   Abdominal:      General: There is no distension.      Palpations: Abdomen is soft.   Neurological:      Mental Status: Alert.         Lab Review:   Lab Results   Component Value Date     12/09/2024    K 3.8 12/09/2024    CL 92 (L) 12/09/2024    CO2 34 (H) 12/09/2024    BUN 26 (H) 12/09/2024    CREATININE 0.83 12/09/2024    GLUCOSE 117 (H) 12/09/2024    CALCIUM 9.8 12/09/2024         Assessment:    1.  Systolic congestive heart failure.  Today's echocardiographic study shows a left ventricular ejection fraction of 25%.  There is global hypokinesis of the left ventricle.  We will start beta-blocker therapy and angiotensin receptor blocker therapy.  Repeat echo in 3 months.  Options for the future include a CCM pacemaker.  Also Jardiance or Farxiga may be added in the future.  Will do a limited echo study on her next visit.    2.  Right heart failure.  Echo study shows reduction in right heart function.    3.  Lower extremity edema.  Improved on torsemide 20 mg daily.    4.  Moderate mitral regurgitation.  Contributing to her heart failure symptoms.    Will get inflammatory markers.  We ordered a right and left heart cardiac catheterization and a cardiac MRI.  She will need some antianxiety medications in order to get through the cardiac MRI.    The cardiac catheterization procedure was explained in detail with the patient and she agrees to proceed.

## 2025-01-10 NOTE — H&P (VIEW-ONLY)
Subjective   Bren Carson is a 59 y.o. female.    Chief Complaint:  Cardiac follow-up for shortness of breath exertional dyspnea and lower extremity edema.    HPI    She is here for follow-up of her echocardiographic study.  She has had some improvement in her peripheral edema and and her exertional dyspnea.  No anginal symptoms.    Her past cardiac history is unremarkable.  No history of hypertension.  No history of an acute coronary event.  Reports normal cholesterol levels.     Past medical history: Significant for degenerative arthritis and osteoarthritis.  Carries a diagnosis of seronegative rheumatoid arthritis.  History of colitis.  History of gastroesophageal reflux disease.  History of left total knee replacement.     Allergies to medications: Amitriptyline, doxycycline, rosuvastatin, Claritin, morphine, Levaquin, Protonix, Prilosec, Rybelsus, simvastatin.     Social history: She is a non-smoker and has never smoked.  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   with 2 children.     Family history: Mother  of congestive heart failure at the age of 80.  Father  during a coronary artery bypass grafting procedure.     Review of Systems   Cardiovascular:  Positive for dyspnea on exertion and leg swelling.   Musculoskeletal:  Positive for arthritis and joint pain.     Current Outpatient Medications   Medication Sig Dispense Refill    celecoxib (CeleBREX) 200 mg capsule Take 1 capsule (200 mg) by mouth 2 times a day.      folic acid (Folvite) 1 mg tablet Take by mouth.      hydroxychloroquine (Plaquenil) 200 mg tablet Take 1 tablet (200 mg) by mouth twice a day.      Klor-Con M20 20 mEq ER tablet TAKE 1 TABLET (20 MEQ) BY MOUTH ONCE DAILY. DO NOT CRUSH, CHEW, OR SPLIT. 30 tablet 2    METHOTREXATE SODIUM INJ 1 GM Injection Solution Reconstituted; Use as directed.      multivitamin tablet Take by mouth.      NON FORMULARY Take 1 each by mouth once  "daily. Potassium 99 MG Oral Tablet; As directed.      Rasuvo, PF, 25 mg/0.5 mL auto-injector       sulfaSALAzine (Azulfidine) 500 mg tablet 500mg once/day x1 week, then 500mg twice/day thereafter      tirzepatide (Mounjaro) 5 mg/0.5 mL pen injector Inject 5 mg under the skin 1 (one) time per week. 2 mL 1    furosemide (Lasix) 40 mg tablet Take 1 tablet (40 mg) by mouth once daily. As needed leg swelling 90 tablet 0    losartan (Cozaar) 25 mg tablet Take 1 tablet (25 mg) by mouth once daily. 30 tablet 11    metoprolol succinate XL (Toprol-XL) 50 mg 24 hr tablet Take 1 tablet (50 mg) by mouth once daily. Do not crush or chew. 30 tablet 11    omeprazole (PriLOSEC) 40 mg DR capsule Take 1 capsule (40 mg) by mouth once daily. 90 capsule 1    torsemide (Demadex) 20 mg tablet Take 1 tablet (20 mg) by mouth once daily. 90 tablet 3     No current facility-administered medications for this visit.        Visit Vitals  /84 (BP Location: Left arm)   Pulse (!) 112   Ht 1.549 m (5' 1\")   Wt 95.3 kg (210 lb)   LMP  (LMP Unknown)   SpO2 92%   BMI 39.68 kg/m²   OB Status Postmenopausal   Smoking Status Never   BSA 2.02 m²        Objective     Constitutional:       Appearance: Not in distress.   Neck:      Vascular: JVD normal.   Pulmonary:      Breath sounds: Normal breath sounds.   Cardiovascular:      Normal rate. Regular rhythm. Normal S1. Normal S2.       Murmurs: There is a grade 1/6 systolic murmur.      No gallop.    Pulses:     Intact distal pulses.   Edema:     Peripheral edema absent.   Abdominal:      General: There is no distension.      Palpations: Abdomen is soft.   Neurological:      Mental Status: Alert.         Lab Review:   Lab Results   Component Value Date     12/09/2024    K 3.8 12/09/2024    CL 92 (L) 12/09/2024    CO2 34 (H) 12/09/2024    BUN 26 (H) 12/09/2024    CREATININE 0.83 12/09/2024    GLUCOSE 117 (H) 12/09/2024    CALCIUM 9.8 12/09/2024         Assessment:    1.  Systolic congestive heart " failure.  Today's echocardiographic study shows a left ventricular ejection fraction of 25%.  There is global hypokinesis of the left ventricle.  We will start beta-blocker therapy and angiotensin receptor blocker therapy.  Repeat echo in 3 months.  Options for the future include a CCM pacemaker.  Also Jardiance or Farxiga may be added in the future.  Will do a limited echo study on her next visit.    2.  Right heart failure.  Echo study shows reduction in right heart function.    3.  Lower extremity edema.  Improved on torsemide 20 mg daily.    4.  Moderate mitral regurgitation.  Contributing to her heart failure symptoms.    Will get inflammatory markers.  We ordered a right and left heart cardiac catheterization and a cardiac MRI.  She will need some antianxiety medications in order to get through the cardiac MRI.    The cardiac catheterization procedure was explained in detail with the patient and she agrees to proceed.

## 2025-01-10 NOTE — PROGRESS NOTES
Subjective   Bren Carson is a 59 y.o. female.    Chief Complaint:  Cardiac follow-up for shortness of breath exertional dyspnea and lower extremity edema.    HPI    She is here for follow-up of her echocardiographic study.  She has had some improvement in her peripheral edema and and her exertional dyspnea.  No anginal symptoms.    Her past cardiac history is unremarkable.  No history of hypertension.  No history of an acute coronary event.  Reports normal cholesterol levels.     Past medical history: Significant for degenerative arthritis and osteoarthritis.  Carries a diagnosis of seronegative rheumatoid arthritis.  History of colitis.  History of gastroesophageal reflux disease.  History of left total knee replacement.     Allergies to medications: Amitriptyline, doxycycline, rosuvastatin, Claritin, morphine, Levaquin, Protonix, Prilosec, Rybelsus, simvastatin.     Social history: She is a non-smoker and has never smoked.  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   with 2 children.     Family history: Mother  of congestive heart failure at the age of 80.  Father  during a coronary artery bypass grafting procedure.     Review of Systems   Cardiovascular:  Positive for dyspnea on exertion and leg swelling.   Musculoskeletal:  Positive for arthritis and joint pain.     Current Outpatient Medications   Medication Sig Dispense Refill    celecoxib (CeleBREX) 200 mg capsule Take 1 capsule (200 mg) by mouth 2 times a day.      folic acid (Folvite) 1 mg tablet Take by mouth.      hydroxychloroquine (Plaquenil) 200 mg tablet Take 1 tablet (200 mg) by mouth twice a day.      Klor-Con M20 20 mEq ER tablet TAKE 1 TABLET (20 MEQ) BY MOUTH ONCE DAILY. DO NOT CRUSH, CHEW, OR SPLIT. 30 tablet 2    METHOTREXATE SODIUM INJ 1 GM Injection Solution Reconstituted; Use as directed.      multivitamin tablet Take by mouth.      NON FORMULARY Take 1 each by mouth once  "daily. Potassium 99 MG Oral Tablet; As directed.      Rasuvo, PF, 25 mg/0.5 mL auto-injector       sulfaSALAzine (Azulfidine) 500 mg tablet 500mg once/day x1 week, then 500mg twice/day thereafter      tirzepatide (Mounjaro) 5 mg/0.5 mL pen injector Inject 5 mg under the skin 1 (one) time per week. 2 mL 1    furosemide (Lasix) 40 mg tablet Take 1 tablet (40 mg) by mouth once daily. As needed leg swelling 90 tablet 0    losartan (Cozaar) 25 mg tablet Take 1 tablet (25 mg) by mouth once daily. 30 tablet 11    metoprolol succinate XL (Toprol-XL) 50 mg 24 hr tablet Take 1 tablet (50 mg) by mouth once daily. Do not crush or chew. 30 tablet 11    omeprazole (PriLOSEC) 40 mg DR capsule Take 1 capsule (40 mg) by mouth once daily. 90 capsule 1    torsemide (Demadex) 20 mg tablet Take 1 tablet (20 mg) by mouth once daily. 90 tablet 3     No current facility-administered medications for this visit.        Visit Vitals  /84 (BP Location: Left arm)   Pulse (!) 112   Ht 1.549 m (5' 1\")   Wt 95.3 kg (210 lb)   LMP  (LMP Unknown)   SpO2 92%   BMI 39.68 kg/m²   OB Status Postmenopausal   Smoking Status Never   BSA 2.02 m²        Objective     Constitutional:       Appearance: Not in distress.   Neck:      Vascular: JVD normal.   Pulmonary:      Breath sounds: Normal breath sounds.   Cardiovascular:      Normal rate. Regular rhythm. Normal S1. Normal S2.       Murmurs: There is a grade 1/6 systolic murmur.      No gallop.    Pulses:     Intact distal pulses.   Edema:     Peripheral edema absent.   Abdominal:      General: There is no distension.      Palpations: Abdomen is soft.   Neurological:      Mental Status: Alert.         Lab Review:   Lab Results   Component Value Date     12/09/2024    K 3.8 12/09/2024    CL 92 (L) 12/09/2024    CO2 34 (H) 12/09/2024    BUN 26 (H) 12/09/2024    CREATININE 0.83 12/09/2024    GLUCOSE 117 (H) 12/09/2024    CALCIUM 9.8 12/09/2024         Assessment:    1.  Systolic congestive heart " failure.  Today's echocardiographic study shows a left ventricular ejection fraction of 25%.  There is global hypokinesis of the left ventricle.  We will start beta-blocker therapy and angiotensin receptor blocker therapy.  Repeat echo in 3 months.  Options for the future include a CCM pacemaker.  Also Jardiance or Farxiga may be added in the future.  Will do a limited echo study on her next visit.    2.  Right heart failure.  Echo study shows reduction in right heart function.    3.  Lower extremity edema.  Improved on torsemide 20 mg daily.    4.  Moderate mitral regurgitation.  Contributing to her heart failure symptoms.    Will get inflammatory markers.  We ordered a right and left heart cardiac catheterization and a cardiac MRI.  She will need some antianxiety medications in order to get through the cardiac MRI.    The cardiac catheterization procedure was explained in detail with the patient and she agrees to proceed.

## 2025-01-13 ENCOUNTER — TELEPHONE (OUTPATIENT)
Dept: CARDIOLOGY | Facility: CLINIC | Age: 60
End: 2025-01-13

## 2025-01-13 ENCOUNTER — APPOINTMENT (OUTPATIENT)
Dept: PHARMACY | Facility: HOSPITAL | Age: 60
End: 2025-01-13
Payer: COMMERCIAL

## 2025-01-13 DIAGNOSIS — E11.9 TYPE 2 DIABETES MELLITUS WITHOUT COMPLICATION, WITHOUT LONG-TERM CURRENT USE OF INSULIN (MULTI): ICD-10-CM

## 2025-01-13 DIAGNOSIS — E66.01 MORBID OBESITY WITH BMI OF 40.0-44.9, ADULT (MULTI): ICD-10-CM

## 2025-01-13 NOTE — TELEPHONE ENCOUNTER
She is calling regarding blood work that is required before the Janurary 31st procedure and has general questions.

## 2025-01-14 ENCOUNTER — APPOINTMENT (OUTPATIENT)
Dept: PRIMARY CARE | Facility: CLINIC | Age: 60
End: 2025-01-14
Payer: COMMERCIAL

## 2025-01-14 VITALS
OXYGEN SATURATION: 92 % | SYSTOLIC BLOOD PRESSURE: 104 MMHG | DIASTOLIC BLOOD PRESSURE: 74 MMHG | TEMPERATURE: 96.9 F | RESPIRATION RATE: 16 BRPM | HEART RATE: 71 BPM | HEIGHT: 61 IN | BODY MASS INDEX: 41.16 KG/M2 | WEIGHT: 218 LBS

## 2025-01-14 DIAGNOSIS — I50.22 CHRONIC SYSTOLIC CONGESTIVE HEART FAILURE, NYHA CLASS 2: Primary | ICD-10-CM

## 2025-01-14 DIAGNOSIS — E66.01 MORBID OBESITY WITH BMI OF 40.0-44.9, ADULT (MULTI): ICD-10-CM

## 2025-01-14 DIAGNOSIS — F41.9 ANXIETY: ICD-10-CM

## 2025-01-14 DIAGNOSIS — R60.0 EDEMA OF BOTH LEGS: ICD-10-CM

## 2025-01-14 DIAGNOSIS — I10 HYPERTENSION, ESSENTIAL, BENIGN: ICD-10-CM

## 2025-01-14 PROCEDURE — 99214 OFFICE O/P EST MOD 30 MIN: CPT | Performed by: FAMILY MEDICINE

## 2025-01-14 RX ORDER — DIAZEPAM 5 MG/1
TABLET ORAL
Qty: 1 TABLET | Refills: 0 | Status: SHIPPED | OUTPATIENT
Start: 2025-01-14

## 2025-01-14 NOTE — PROGRESS NOTES
"Subjective   Patient ID: Bren Carson is a 59 y.o. female who presents for Follow-up (refills) and Anxiety (Needs anxiety med for when she has MRI on 1/31).    HPI  Patient comes in today for checkup and evaluation for upcoming heart tests.  Patient has been in to see cardiology and has been told she is in heart failure by an echo that she had done.  She is scheduled scheduled for a cardiac catheterization 1/29/2025 as well as an MRI on 1/31/2025.  She is claustrophobic and is requesting medication for the MRI.  Patient also would like to seek a second opinion from a cardiologist at [facility].    Cardiology has changed a number of her medicines.  She is no longer taking Lasix or metolazone and is instead taking torsemide and metoprolol succinate.    12/9/2024  Today for 1 week follow-up on her water retention/edema.  She has lost 24 pounds since she was here last week with the diuretics.  She is feeling much better.  She is no longer short of breath and she has no edema currently on her legs.  Patient also started her Mounjaro last week and is due for her second shot.  So far she seems to be tolerating it well.      Review of Systems   All other systems reviewed and are negative.      Objective   Vitals:  /74   Pulse 71   Temp 36.1 °C (96.9 °F)   Resp 16   Ht 1.549 m (5' 1\")   Wt 98.9 kg (218 lb)   LMP  (LMP Unknown)   SpO2 92%   BMI 41.19 kg/m²     Physical Exam  Vitals reviewed.   Constitutional:       Appearance: She is morbidly obese.   HENT:      Head: Normocephalic and atraumatic.      Right Ear: Tympanic membrane, ear canal and external ear normal.      Left Ear: Tympanic membrane, ear canal and external ear normal.      Nose: Nose normal.      Mouth/Throat:      Mouth: Mucous membranes are moist.      Pharynx: Oropharynx is clear.   Eyes:      Extraocular Movements: Extraocular movements intact.      Conjunctiva/sclera: Conjunctivae normal.      Pupils: Pupils are equal, round, and reactive to " light.   Cardiovascular:      Rate and Rhythm: Normal rate and regular rhythm.      Heart sounds: Normal heart sounds. No murmur heard.  Pulmonary:      Effort: Pulmonary effort is normal.      Breath sounds: Normal breath sounds. No wheezing.   Abdominal:      General: Abdomen is flat. Bowel sounds are normal.      Palpations: Abdomen is soft.   Musculoskeletal:         General: Normal range of motion.      Right lower leg: Edema present.      Left lower leg: Edema present.   Skin:     General: Skin is warm and dry.   Neurological:      General: No focal deficit present.      Mental Status: She is alert and oriented to person, place, and time. Mental status is at baseline.   Psychiatric:         Mood and Affect: Mood normal.         Behavior: Behavior normal.         Thought Content: Thought content normal.         Judgment: Judgment normal.         Assessment/Plan   Problem List Items Addressed This Visit       Anxiety    Relevant Medications    diazePAM (Valium) 5 mg tablet    Edema of both legs    Hypertension, essential, benign    Morbid obesity with BMI of 40.0-44.9, adult (Multi)    Chronic systolic congestive heart failure, NYHA class 2 - Primary    Relevant Orders    Referral to Cardiology   Continue follow-up with cardiology and multiple procedures and tests.  Continue current meds as directed.  Follow up in 3 months for recheck if all remains stable, sooner if problems arise.  Medication list reconciled.  Thank you for visiting today!      Professional services: Some of this note was completed using Dragon voice technology and sometimes the software misinterprets words. This may include unintended errors with respect to translation of words, typographical errors or grammar errors which may not have been identified prior to finalization of the chart note. Please take this into account when reading the note. Thank you.       Octavio Garcia DO 01/15/25 6:58 AM

## 2025-01-15 PROBLEM — E11.9 DIABETES MELLITUS, TYPE 2 (MULTI): Status: RESOLVED | Noted: 2023-03-17 | Resolved: 2025-01-15

## 2025-01-15 RX ORDER — TIRZEPATIDE 5 MG/.5ML
5 INJECTION, SOLUTION SUBCUTANEOUS WEEKLY
Qty: 2 ML | Refills: 1 | Status: SHIPPED | OUTPATIENT
Start: 2025-01-15

## 2025-01-15 NOTE — PROGRESS NOTES
"Subjective     Patient ID: Bren Carson is a 59 y.o. female who presents for diabetes management.    HPI    Allergies   Allergen Reactions    Doxycycline GI Upset     Severe GI upset    Amitriptyline Other     Reactions: \"Knocked me out\"    Claritin [Loratadine] Unknown    Crestor [Rosuvastatin] Unknown    Levaquin [Levofloxacin] Unknown    Morphine Unknown    Pantoprazole Unknown    Prilosec [Omeprazole] Unknown    Rybelsus [Semaglutide] Other     Stomach cramping, diarrhea and emesis    Simvastatin Unknown     Objective     Current DM Pharmacotherapy:   - Mounjaro 5 mg weekly    SECONDARY PREVENTION  - Statin? No  - ACE-I/ARB? No  - Aspirin? No    Pertinent PMH Review:  - PMH of Pancreatitis: No  - PMH/FH of Medullary Thyroid Cancer: No  - PMH of Retinopathy: No  - PMH of Urinary Tract Infections: No    Lab Review  Lab Results   Component Value Date    BILITOT 0.4 02/26/2024    CALCIUM 9.1 01/10/2025    CO2 35 (H) 01/10/2025    CL 98 01/10/2025    CREATININE 0.65 01/10/2025    GLUCOSE 81 01/10/2025    ALKPHOS 70 02/26/2024    K 3.4 (L) 01/10/2025    PROT 6.8 02/26/2024     01/10/2025    AST 18 02/26/2024    ALT 16 02/26/2024    BUN 18 01/10/2025    ANIONGAP 11 01/10/2025    MG 1.80 11/16/2022    ALBUMIN 4.0 02/26/2024    GFRF >90 03/21/2023     No results found for: \"TRIG\", \"CHOL\", \"LDL\", \"LDLCALC\", \"HDL\"  Lab Results   Component Value Date    HGBA1C 5.6 11/12/2024     The ASCVD Risk score (Bushra DK, et al., 2019) failed to calculate for the following reasons:    Cannot find a previous HDL lab    Cannot find a previous total cholesterol lab    Assessment/Plan     Problem List Items Addressed This Visit       RESOLVED: Diabetes mellitus, type 2 (Multi)    Morbid obesity with BMI of 40.0-44.9, adult (Multi)   Patient had no issues with increased dose of Mounjaro. She does have a surgery at the end of the month. Will hold patient on current dose while patient under goes and recovers from surgery. Will " follow up in 4 weeks for dose titration.    Type 2 diabetes mellitus, is at goal. <7%    CONTINUE Mounjaro 5 mg weekly  Follow up: I recommend diabetes care be 4 weeks.    Felicia Gonzalez, PharmD    Continue all meds under the continuation of care with the referring provider and clinical pharmacy team

## 2025-01-27 ENCOUNTER — TELEPHONE (OUTPATIENT)
Dept: CARDIOLOGY | Facility: CLINIC | Age: 60
End: 2025-01-27
Payer: COMMERCIAL

## 2025-01-27 NOTE — TELEPHONE ENCOUNTER
Radha,    Please give Bren a call, she has a couple questions regarding her upcoming CATH.    One questions is does she need more blood work prior.    Thanks  Meaghan

## 2025-01-28 PROBLEM — I50.22 CHRONIC SYSTOLIC HEART FAILURE: Status: ACTIVE | Noted: 2025-01-10

## 2025-01-29 ENCOUNTER — HOSPITAL ENCOUNTER (OUTPATIENT)
Dept: CARDIOLOGY | Facility: HOSPITAL | Age: 60
Setting detail: OUTPATIENT SURGERY
Discharge: HOME | End: 2025-01-29
Payer: COMMERCIAL

## 2025-01-29 ENCOUNTER — HOSPITAL ENCOUNTER (OUTPATIENT)
Facility: HOSPITAL | Age: 60
Setting detail: OUTPATIENT SURGERY
Discharge: HOME | End: 2025-01-29
Attending: INTERNAL MEDICINE | Admitting: INTERNAL MEDICINE
Payer: COMMERCIAL

## 2025-01-29 VITALS
DIASTOLIC BLOOD PRESSURE: 85 MMHG | HEIGHT: 61 IN | BODY MASS INDEX: 40.67 KG/M2 | TEMPERATURE: 98.1 F | SYSTOLIC BLOOD PRESSURE: 131 MMHG | OXYGEN SATURATION: 94 % | WEIGHT: 215.39 LBS | HEART RATE: 80 BPM | RESPIRATION RATE: 14 BRPM

## 2025-01-29 DIAGNOSIS — I50.22 CHRONIC SYSTOLIC HEART FAILURE: ICD-10-CM

## 2025-01-29 DIAGNOSIS — I50.22 CHRONIC SYSTOLIC CONGESTIVE HEART FAILURE, NYHA CLASS 2: ICD-10-CM

## 2025-01-29 DIAGNOSIS — I42.8 OTHER CARDIOMYOPATHIES: ICD-10-CM

## 2025-01-29 LAB
BASE EXCESS BLDA CALC-SCNC: 2.2 MMOL/L (ref -2–3)
BASE EXCESS BLDMV CALC-SCNC: 4.7 MMOL/L (ref -2–3)
BODY TEMPERATURE: 37 DEGREES CELSIUS
BODY TEMPERATURE: 37 DEGREES CELSIUS
HCO3 BLDA-SCNC: 27.3 MMOL/L (ref 22–26)
HCO3 BLDMV-SCNC: 30.9 MMOL/L (ref 22–26)
INHALED O2 CONCENTRATION: 21 %
INHALED O2 CONCENTRATION: 21 %
OXYHGB MFR BLDA: 89.8 % (ref 94–98)
OXYHGB MFR BLDMV: 68.7 % (ref 45–75)
PCO2 BLDA: 43 MM HG (ref 38–42)
PCO2 BLDMV: 51 MM HG (ref 41–51)
PH BLDA: 7.41 PH (ref 7.38–7.42)
PH BLDMV: 7.39 PH (ref 7.33–7.43)
PO2 BLDA: 65 MM HG (ref 85–95)
PO2 BLDMV: 42 MM HG (ref 35–45)
SAO2 % BLDA: 92 % (ref 94–100)
SAO2 % BLDMV: 70 % (ref 45–75)
SITE OF ARTERIAL PUNCTURE: ABNORMAL

## 2025-01-29 PROCEDURE — 2550000001 HC RX 255 CONTRASTS: Performed by: INTERNAL MEDICINE

## 2025-01-29 PROCEDURE — C1887 CATHETER, GUIDING: HCPCS | Performed by: INTERNAL MEDICINE

## 2025-01-29 PROCEDURE — 2500000001 HC RX 250 WO HCPCS SELF ADMINISTERED DRUGS (ALT 637 FOR MEDICARE OP): Performed by: INTERNAL MEDICINE

## 2025-01-29 PROCEDURE — 82810 BLOOD GASES O2 SAT ONLY: CPT

## 2025-01-29 PROCEDURE — 7100000009 HC PHASE TWO TIME - INITIAL BASE CHARGE: Performed by: INTERNAL MEDICINE

## 2025-01-29 PROCEDURE — 99153 MOD SED SAME PHYS/QHP EA: CPT | Performed by: INTERNAL MEDICINE

## 2025-01-29 PROCEDURE — C1760 CLOSURE DEV, VASC: HCPCS | Performed by: INTERNAL MEDICINE

## 2025-01-29 PROCEDURE — 93460 R&L HRT ART/VENTRICLE ANGIO: CPT | Performed by: INTERNAL MEDICINE

## 2025-01-29 PROCEDURE — 82810 BLOOD GASES O2 SAT ONLY: CPT | Mod: CCI

## 2025-01-29 PROCEDURE — C1769 GUIDE WIRE: HCPCS | Performed by: INTERNAL MEDICINE

## 2025-01-29 PROCEDURE — 2720000007 HC OR 272 NO HCPCS: Performed by: INTERNAL MEDICINE

## 2025-01-29 PROCEDURE — 2780000003 HC OR 278 NO HCPCS: Performed by: INTERNAL MEDICINE

## 2025-01-29 PROCEDURE — C1894 INTRO/SHEATH, NON-LASER: HCPCS | Performed by: INTERNAL MEDICINE

## 2025-01-29 PROCEDURE — 2500000004 HC RX 250 GENERAL PHARMACY W/ HCPCS (ALT 636 FOR OP/ED): Performed by: INTERNAL MEDICINE

## 2025-01-29 PROCEDURE — 99152 MOD SED SAME PHYS/QHP 5/>YRS: CPT | Performed by: INTERNAL MEDICINE

## 2025-01-29 PROCEDURE — 2500000005 HC RX 250 GENERAL PHARMACY W/O HCPCS: Performed by: INTERNAL MEDICINE

## 2025-01-29 PROCEDURE — 7100000010 HC PHASE TWO TIME - EACH INCREMENTAL 1 MINUTE: Performed by: INTERNAL MEDICINE

## 2025-01-29 RX ORDER — MIDAZOLAM HYDROCHLORIDE 1 MG/ML
INJECTION, SOLUTION INTRAMUSCULAR; INTRAVENOUS AS NEEDED
Status: DISCONTINUED | OUTPATIENT
Start: 2025-01-29 | End: 2025-01-29 | Stop reason: HOSPADM

## 2025-01-29 RX ORDER — LIDOCAINE HYDROCHLORIDE 20 MG/ML
INJECTION, SOLUTION INFILTRATION; PERINEURAL AS NEEDED
Status: DISCONTINUED | OUTPATIENT
Start: 2025-01-29 | End: 2025-01-29 | Stop reason: HOSPADM

## 2025-01-29 RX ORDER — METOPROLOL SUCCINATE 50 MG/1
50 TABLET, EXTENDED RELEASE ORAL 2 TIMES DAILY
Qty: 60 TABLET | Refills: 1 | Status: SHIPPED | OUTPATIENT
Start: 2025-01-29 | End: 2025-03-30

## 2025-01-29 RX ORDER — LIDOCAINE HYDROCHLORIDE AND EPINEPHRINE 10; 20 UG/ML; MG/ML
3 INJECTION, SOLUTION INFILTRATION; PERINEURAL ONCE AS NEEDED
Status: CANCELLED | OUTPATIENT
Start: 2025-01-29

## 2025-01-29 RX ORDER — HEPARIN SODIUM 1000 [USP'U]/ML
INJECTION, SOLUTION INTRAVENOUS; SUBCUTANEOUS AS NEEDED
Status: DISCONTINUED | OUTPATIENT
Start: 2025-01-29 | End: 2025-01-29 | Stop reason: HOSPADM

## 2025-01-29 RX ORDER — ACETAMINOPHEN 325 MG/1
650 TABLET ORAL EVERY 6 HOURS PRN
Status: DISCONTINUED | OUTPATIENT
Start: 2025-01-29 | End: 2025-01-29 | Stop reason: HOSPADM

## 2025-01-29 RX ORDER — ASPIRIN 325 MG/1
325 TABLET, FILM COATED ORAL DAILY
Status: DISCONTINUED | OUTPATIENT
Start: 2025-01-29 | End: 2025-01-29

## 2025-01-29 RX ORDER — SPIRONOLACTONE 25 MG/1
25 TABLET ORAL DAILY
Qty: 30 TABLET | Refills: 1 | Status: SHIPPED | OUTPATIENT
Start: 2025-01-29 | End: 2025-03-30

## 2025-01-29 RX ORDER — ASPIRIN 325 MG
325 TABLET ORAL DAILY
Status: DISCONTINUED | OUTPATIENT
Start: 2025-01-29 | End: 2025-01-29 | Stop reason: HOSPADM

## 2025-01-29 RX ORDER — VERAPAMIL HYDROCHLORIDE 2.5 MG/ML
INJECTION, SOLUTION INTRAVENOUS AS NEEDED
Status: DISCONTINUED | OUTPATIENT
Start: 2025-01-29 | End: 2025-01-29 | Stop reason: HOSPADM

## 2025-01-29 RX ORDER — SPIRONOLACTONE 25 MG/1
25 TABLET ORAL DAILY
Status: DISCONTINUED | OUTPATIENT
Start: 2025-01-29 | End: 2025-01-29 | Stop reason: HOSPADM

## 2025-01-29 RX ORDER — FENTANYL CITRATE 50 UG/ML
INJECTION, SOLUTION INTRAMUSCULAR; INTRAVENOUS AS NEEDED
Status: DISCONTINUED | OUTPATIENT
Start: 2025-01-29 | End: 2025-01-29 | Stop reason: HOSPADM

## 2025-01-29 RX ADMIN — ASPIRIN 325 MG ORAL TABLET 325 MG: 325 PILL ORAL at 08:05

## 2025-01-29 ASSESSMENT — PAIN SCALES - GENERAL
PAINLEVEL_OUTOF10: 0 - NO PAIN

## 2025-01-29 ASSESSMENT — PAIN - FUNCTIONAL ASSESSMENT
PAIN_FUNCTIONAL_ASSESSMENT: 0-10

## 2025-01-29 ASSESSMENT — COLUMBIA-SUICIDE SEVERITY RATING SCALE - C-SSRS
6. HAVE YOU EVER DONE ANYTHING, STARTED TO DO ANYTHING, OR PREPARED TO DO ANYTHING TO END YOUR LIFE?: NO
1. IN THE PAST MONTH, HAVE YOU WISHED YOU WERE DEAD OR WISHED YOU COULD GO TO SLEEP AND NOT WAKE UP?: NO
2. HAVE YOU ACTUALLY HAD ANY THOUGHTS OF KILLING YOURSELF?: NO

## 2025-01-29 NOTE — NURSING NOTE
IV removed. Discharge instructions reviewed with patient and family. Verbalized reinforcement. VSS. Right radial and right brachial site dressings clean, dry and intact. No signs/symptoms of bleeding or hematoma. Ok to discharge to [facility] via wheelchair by cath lab RN.

## 2025-01-29 NOTE — POST-PROCEDURE NOTE
Physician Transition of Care Summary  Invasive Cardiovascular Lab    Procedure Date: 1/29/2025  Attending:    * Indra Ramirez - Primary  Resident/Fellow/Other Assistant: Surgeons and Role:  * No surgeons found with a matching role *    Indications:   Pre-op Diagnosis      * Chronic systolic heart failure [I50.22]    Post-procedure diagnosis:   Post-op Diagnosis     * Chronic systolic heart failure [I50.22]    Procedure(s):   Left And Right Heart Cath, Without LV  21722 - CO R & L HRT CATH WINJX HRT ART& L VENTR IMG        Procedure Findings:   Normal coronary arteries, mildly elevated right left heart filling pressures, preserved cardiac output and index    Description of the Procedure:   Right and left heart catheterization    Complications:   None    Stents/Implants:   Implants       No implant documentation for this case.            Anticoagulation/Antiplatelet Plan:   Heparin    Estimated Blood Loss:   * No values recorded between 1/29/2025  8:12 AM and 1/29/2025  8:49 AM *    Anesthesia: Moderate Sedation Anesthesia Staff: No anesthesia staff entered.    Any Specimen(s) Removed:   No specimens collected during this procedure.    Disposition:   Home      Electronically signed by: Indra Ramirez MD, 1/29/2025 8:49 AM

## 2025-01-29 NOTE — PRE-SEDATION DOCUMENTATION
Sedation Plan    ASA 3     Mallampati class: II.    Risks, benefits, and alternatives discussed with patient.      
Contraindicated

## 2025-01-29 NOTE — NURSING NOTE
Discharge instructions reviewed with patient and family, both verbalized understanding of instructions. Site stable with no bleeding or hematoma observed. IV access removed. Belongings returned. No complaint of nausea or pain. Patient taken to lobby in wheelchair.

## 2025-01-30 ENCOUNTER — TELEPHONE (OUTPATIENT)
Dept: CARDIOLOGY | Facility: CLINIC | Age: 60
End: 2025-01-30
Payer: COMMERCIAL

## 2025-01-30 DIAGNOSIS — I42.8 OTHER CARDIOMYOPATHIES: ICD-10-CM

## 2025-01-30 DIAGNOSIS — I50.22 CHRONIC SYSTOLIC HEART FAILURE: ICD-10-CM

## 2025-01-30 LAB
ATRIAL RATE: 80 BPM
P AXIS: 13 DEGREES
P OFFSET: 208 MS
P ONSET: 146 MS
PR INTERVAL: 150 MS
Q ONSET: 221 MS
QRS COUNT: 13 BEATS
QRS DURATION: 98 MS
QT INTERVAL: 424 MS
QTC CALCULATION(BAZETT): 489 MS
QTC FREDERICIA: 467 MS
R AXIS: -42 DEGREES
T AXIS: 106 DEGREES
T OFFSET: 433 MS
VENTRICULAR RATE: 80 BPM

## 2025-01-30 PROCEDURE — 93005 ELECTROCARDIOGRAM TRACING: CPT

## 2025-01-30 RX ORDER — SPIRONOLACTONE 25 MG/1
25 TABLET ORAL DAILY
Qty: 30 TABLET | Refills: 11 | Status: CANCELLED | OUTPATIENT
Start: 2025-01-30 | End: 2026-01-30

## 2025-01-30 NOTE — TELEPHONE ENCOUNTER
Received following telephone message: jerome Garcia: Had L&RHC yesterday Wed 1/29/25 last night she threw up 4 times within an hour and today some Diarrhea. not feeling ill.  Also feeling some chest pressure   Patient is wondering if this could be from the Cath with the sedation and meds she was given.     Called pt to discuss. Pt states she had cath yesterday morning and was fine when she went home. Pt states she woke up around 3AM this morning with nausea, vomiting and diarrhea for approx an hour. Pt denies chest pain or shortness of breath. Pt states she has indigestion which she informed the doctor at the cath yesterday but not chest pain. Informed pt there is multiple cases of norovirus, Flu type A and B and also respiratory such as RSV. Instructed pt to monitor sx's and if she has any chest pain or SOB to go to the ER for evaluation. Instructed pt to stay hydrated and if she has continued N/V/D to contact PCP for further evaluation. Pt verbalizes understanding of all instructions and information provided.

## 2025-01-31 ENCOUNTER — HOSPITAL ENCOUNTER (OUTPATIENT)
Dept: RADIOLOGY | Facility: CLINIC | Age: 60
Discharge: HOME | End: 2025-01-31
Payer: COMMERCIAL

## 2025-01-31 DIAGNOSIS — I50.22 CHRONIC SYSTOLIC CONGESTIVE HEART FAILURE, NYHA CLASS 2: ICD-10-CM

## 2025-01-31 RX ORDER — GADOTERATE MEGLUMINE 376.9 MG/ML
38 INJECTION INTRAVENOUS
Status: DISCONTINUED | OUTPATIENT
Start: 2025-01-31 | End: 2025-01-31

## 2025-02-03 ENCOUNTER — OFFICE VISIT (OUTPATIENT)
Dept: PRIMARY CARE | Facility: CLINIC | Age: 60
End: 2025-02-03
Payer: COMMERCIAL

## 2025-02-03 VITALS
SYSTOLIC BLOOD PRESSURE: 83 MMHG | DIASTOLIC BLOOD PRESSURE: 56 MMHG | WEIGHT: 208 LBS | BODY MASS INDEX: 40.62 KG/M2 | RESPIRATION RATE: 20 BRPM | HEART RATE: 71 BPM | OXYGEN SATURATION: 96 % | TEMPERATURE: 98.3 F

## 2025-02-03 DIAGNOSIS — B02.9 HERPES ZOSTER WITHOUT COMPLICATION: Primary | ICD-10-CM

## 2025-02-03 DIAGNOSIS — E66.01 MORBID OBESITY WITH BMI OF 40.0-44.9, ADULT (MULTI): ICD-10-CM

## 2025-02-03 DIAGNOSIS — G47.33 OSA (OBSTRUCTIVE SLEEP APNEA): ICD-10-CM

## 2025-02-03 PROCEDURE — 99213 OFFICE O/P EST LOW 20 MIN: CPT | Performed by: FAMILY MEDICINE

## 2025-02-03 RX ORDER — VALACYCLOVIR HYDROCHLORIDE 1 G/1
1000 TABLET, FILM COATED ORAL 3 TIMES DAILY
Qty: 21 TABLET | Refills: 0 | Status: SHIPPED | OUTPATIENT
Start: 2025-02-03 | End: 2025-02-10

## 2025-02-03 NOTE — PROGRESS NOTES
Subjective   Patient ID: Bren Carson is a 59 y.o. female who presents for Rash (Back of right arm. Noticed Friday evening. Very bothersome-painful, prickly, and feels bruised. Has had shingles before and feels it is similar although it was on her face last time/).    HPI  Patient comes in today with complaints of a painful rash on the medial side of her right upper arm since Friday.  She noticed it Friday night and showed it to her  and he recommended that she make an appointment.  She is here today for evaluation.    Patient is also in need of a new CPAP machine and supplies.  She was told by her supplier that she is in need of new equipment.  She uses [employer].    Patient is using Mounjaro now and has so far lost 30 pounds since 12/2/2024.  Patient has lost 56 pounds since December 2023.    Review of Systems   All other systems reviewed and are negative.      Objective   Vitals:  BP 83/56   Pulse 71   Temp 36.8 °C (98.3 °F)   Resp 20   Wt 94.3 kg (208 lb)   LMP  (LMP Unknown)   SpO2 96%   BMI 40.62 kg/m²     Physical Exam  Vitals reviewed.   Constitutional:       Appearance: She is morbidly obese.   HENT:      Head: Normocephalic and atraumatic.      Right Ear: Tympanic membrane, ear canal and external ear normal.      Left Ear: Tympanic membrane, ear canal and external ear normal.      Nose: Nose normal.      Mouth/Throat:      Mouth: Mucous membranes are moist.      Pharynx: Oropharynx is clear.   Eyes:      Extraocular Movements: Extraocular movements intact.      Conjunctiva/sclera: Conjunctivae normal.      Pupils: Pupils are equal, round, and reactive to light.   Cardiovascular:      Rate and Rhythm: Normal rate and regular rhythm.      Heart sounds: Normal heart sounds. No murmur heard.  Pulmonary:      Effort: Pulmonary effort is normal.      Breath sounds: Normal breath sounds. No wheezing.   Abdominal:      General: Abdomen is flat. Bowel sounds are normal.      Palpations: Abdomen is  soft.   Musculoskeletal:         General: Normal range of motion.   Skin:     General: Skin is warm and dry.      Findings: Rash (Painful cluster of vesicular papules in the right medial upper extremity) present.   Neurological:      General: No focal deficit present.      Mental Status: She is alert and oriented to person, place, and time. Mental status is at baseline.   Psychiatric:         Mood and Affect: Mood normal.         Behavior: Behavior normal.         Thought Content: Thought content normal.         Judgment: Judgment normal.       Assessment/Plan   Problem List Items Addressed This Visit       Shingles rash - Primary    Relevant Medications    valACYclovir (Valtrex) 1 gram tablet    Morbid obesity with BMI of 40.0-44.9, adult (Multi)    MARK (obstructive sleep apnea)   Continue current meds as directed.  Follow up in 3 months for recheck if all remains stable, sooner if problems arise.  Medication list reconciled.  Thank you for visiting today!      Professional services: Some of this note was completed using Dragon voice technology and sometimes the software misinterprets words. This may include unintended errors with respect to translation of words, typographical errors or grammar errors which may not have been identified prior to finalization of the chart note. Please take this into account when reading the note. Thank you.         Octavio Garcia DO 02/07/25 1:05 PM

## 2025-02-06 PROBLEM — E66.01 CLASS 3 SEVERE OBESITY WITH BODY MASS INDEX (BMI) OF 40.0 TO 44.9 IN ADULT: Status: ACTIVE | Noted: 2024-12-27

## 2025-02-06 PROBLEM — I50.22 CHRONIC SYSTOLIC CONGESTIVE HEART FAILURE, NYHA CLASS 2: Status: RESOLVED | Noted: 2025-01-10 | Resolved: 2025-02-06

## 2025-02-06 PROBLEM — E66.01 MORBID OBESITY WITH BMI OF 40.0-44.9, ADULT (MULTI): Status: RESOLVED | Noted: 2023-03-17 | Resolved: 2025-02-06

## 2025-02-06 PROBLEM — E66.813 CLASS 3 SEVERE OBESITY WITH BODY MASS INDEX (BMI) OF 40.0 TO 44.9 IN ADULT: Status: ACTIVE | Noted: 2024-12-27

## 2025-02-06 NOTE — PROGRESS NOTES
Reason For Consult:    Possible ICD insertion    History Of Present Illness:    This is a 59-year-old female with congestive heart failure.  She is being seen today in consultation at the request of Dr. Palmer.  The patient is being treated for congestive heart failure, and has a dilated nonischemic cardiomyopathy, ejection fraction 25%.  Overall she is feeling better with the addition of torsemide and spironolactone.  Cardiology records from Dr. Palmer reviewed today.    Past medical history:    Arthritis  GERD  Left total knee replacement    Social history: Non-smoker    Family history: Her mother had congestive heart failure, father had CAD    Review of systems:  Other review of systems negative other than as outlined in HPI    Social History:  She reports that she has never smoked. She has never used smokeless tobacco. She reports that she does not currently use alcohol. She reports that she does not use drugs.    Family History:  Family History   Problem Relation Name Age of Onset    Other cancer Mother      Coronary artery disease Father      Cancer Paternal Grandfather         Allergies:  Doxycycline, Amitriptyline, Claritin [loratadine], Crestor [rosuvastatin], Levaquin [levofloxacin], Morphine, Pantoprazole, Prilosec [omeprazole], Rybelsus [semaglutide], and Simvastatin    Medications:  Current Outpatient Medications   Medication Instructions    celecoxib (CeleBREX) 200 mg capsule 1 capsule, 2 times daily    diazePAM (Valium) 5 mg tablet Take 30 minutes before procedure    folic acid (Folvite) 1 mg tablet Take by mouth.    hydroxychloroquine (PLAQUENIL) 200 mg, 2 times daily    Klor-Con M20 20 mEq ER tablet 20 mEq, oral, Daily, do not crush, chew, or split    losartan (COZAAR) 25 mg, oral, Daily    METHOTREXATE SODIUM INJ 1 GM Injection Solution Reconstituted; Use as directed.    metoprolol succinate XL (TOPROL-XL) 50 mg, oral, 2 times daily, Do not crush or chew.    Mounjaro 5 mg, subcutaneous, Weekly  "   multivitamin tablet Take by mouth.    omeprazole (PRILOSEC) 40 mg, oral, Daily    spironolactone (ALDACTONE) 25 mg, oral, Daily    sulfaSALAzine (Azulfidine) 500 mg tablet 500mg once/day x1 week, then 500mg twice/day thereafter    torsemide (DEMADEX) 20 mg, oral, Daily    valACYclovir (VALTREX) 1,000 mg, oral, 3 times daily       Vitals:      1/10/2025     8:08 AM 1/10/2025     9:08 AM 1/14/2025     4:30 PM 1/29/2025     7:40 AM 1/29/2025     7:51 AM 1/31/2025     7:41 AM 2/3/2025     2:28 PM   Vitals   Systolic  118 104  131  83   Diastolic  84 74  85  56   BP Location  Left arm        Heart Rate  112 71  80  71   Temp   36.1 °C (96.9 °F) 2.6 °C (36.7 °F) 36.7 °C (98.1 °F)  36.8 °C (98.3 °F)   Resp   16  14  20   Height 1.549 m (5' 1\") 1.549 m (5' 1\") 1.549 m (5' 1\")  1.549 m (5' 1\") 1.524 m (5')    Weight (lb)  210 218  215.39 210 208   BMI 39.68 kg/m2 39.68 kg/m2 41.19 kg/m2  40.7 kg/m2 41.01 kg/m2 40.62 kg/m2   BSA (m2) 2.03 m2 2.03 m2 2.06 m2  2.05 m2 2.01 m2 2 m2   Visit Report Report Report Report    Report     Physical Exam:    General Appearance:  Alert, oriented, no distress  Skin:  Warm and dry  Head and Neck:  No elevation of JVP, no carotid bruits  Cardiac Exam:  Rhythm is regular, S1 and S2 are normal, no murmur S3 or S4  Lungs:  Clear to auscultation  Extremities:  no edema  Neurologic:  No focal deficits  Psychiatric:  Appropriate mood and behavior    Lab Results:     CMP:  Recent Labs     01/10/25  1018 12/09/24  0910 02/26/24  1715 12/11/23  1045 03/21/23  1613 11/16/22  1004 02/09/21  1529 03/05/19  1547    138 140 144 139 140 140  --    K 3.4* 3.8 4.2 3.7 4.3 4.2 3.9  --    CL 98 92* 100 99 100 98 102  --    CO2 35* 34* 33* 36* 30 33* 30  --    ANIONGAP 11 16 11 13 13 13 12  --    BUN 18 26* 17 15 18 17 17 14   CREATININE 0.65 0.83 0.69 0.70 0.62 0.52 0.59 0.61   EGFR >90 81 >90 >90  --   --   --   --    MG  --   --   --   --   --  1.80  --   --      Recent Labs     02/26/24  8665 " "12/11/23  1045 03/21/23  1613 11/16/22  1004 02/09/21  1529   ALBUMIN 4.0 3.9 3.8 4.3 4.0   ALKPHOS 70 59 67 80 98   ALT 16 22 18 22 29   AST 18 24 16 18 20   BILITOT 0.4 1.0 0.3 0.6 0.3     CBC:  Recent Labs     01/10/25  1018 11/16/22  1004 02/09/21  1529 03/05/19  1547   WBC 7.2 9.7 10.3 9.6   HGB 14.7 16.0 14.3 14.7   HCT 44.7 50.6* 45.5 46.1*    323 354 327   MCV 93 93 97 95     COAG:   Recent Labs     01/10/25  1018 11/16/22  1004   INR 1.1  --    DDIMERVTE  --  585*     HEME/ENDO:  Recent Labs     11/12/24  1714 12/11/23  1045 03/21/23  1613   TSH  --   --  1.15   HGBA1C 5.6 7.0* 6.9*      CARDIAC:   Recent Labs     11/16/22  1319 11/16/22  1124 11/16/22  1004   TROPHS 27* 32* 32*   BNP  --   --  319*   No results for input(s): \"CHOL\", \"LDLF\", \"HDL\", \"TRIG\" in the last 71264 hours.    Test Results (personally reviewed):    Echocardiogram January 2025: Ejection fraction 25% with moderate mitral valve regurgitation.  Cardiac catheterization January 2025: Angiographically normal coronary arteries  EKG normal sinus rhythm, QRS duration 98 ms    Assessment/Plan  Problem List Items Addressed This Visit             ICD-10-CM    Chronic systolic heart failure - Primary I50.22     This patient has a dilated nonischemic cardiomyopathy, ejection fraction 25%.  She is euvolemic at this time.  She will be considered for a primary prevention ICD if her ejection fraction does not improve.  As of January 10, 2025 her ejection fraction was 25%.  I have recommended the addition of Entresto, starting at 24/26 mg twice daily and this will be titrated up as tolerated.  The patient will be following up with Dr. Palmer as scheduled later this month.  If her ejection fraction remains below 35% on medical therapy, we will consider a primary prevention ICD.  The indications for ICD's were discussed today.  I also provided shared decision making literature regarding primary prevention ICD's.         Relevant Medications    " sacubitriL-valsartan (Entresto) 24-26 mg tablet     James C Ramicone, DO

## 2025-02-07 ENCOUNTER — OFFICE VISIT (OUTPATIENT)
Dept: CARDIOLOGY | Facility: CLINIC | Age: 60
End: 2025-02-07
Payer: COMMERCIAL

## 2025-02-07 VITALS
BODY MASS INDEX: 40.84 KG/M2 | WEIGHT: 208 LBS | DIASTOLIC BLOOD PRESSURE: 80 MMHG | HEIGHT: 60 IN | SYSTOLIC BLOOD PRESSURE: 110 MMHG | HEART RATE: 71 BPM | OXYGEN SATURATION: 91 %

## 2025-02-07 DIAGNOSIS — I50.22 CHRONIC SYSTOLIC HEART FAILURE: Primary | ICD-10-CM

## 2025-02-07 PROBLEM — G47.33 OSA (OBSTRUCTIVE SLEEP APNEA): Status: ACTIVE | Noted: 2025-02-07

## 2025-02-07 PROCEDURE — 99214 OFFICE O/P EST MOD 30 MIN: CPT | Performed by: INTERNAL MEDICINE

## 2025-02-07 RX ORDER — SACUBITRIL AND VALSARTAN 24; 26 MG/1; MG/1
1 TABLET, FILM COATED ORAL 2 TIMES DAILY
Qty: 60 TABLET | Refills: 11 | Status: SHIPPED | OUTPATIENT
Start: 2025-02-07 | End: 2026-02-07

## 2025-02-07 NOTE — ASSESSMENT & PLAN NOTE
This patient has a dilated nonischemic cardiomyopathy, ejection fraction 25%.  She is euvolemic at this time.  She will be considered for a primary prevention ICD if her ejection fraction does not improve.  As of January 10, 2025 her ejection fraction was 25%.  I have recommended the addition of Entresto, starting at 24/26 mg twice daily and this will be titrated up as tolerated.  The patient will be following up with Dr. Palmer as scheduled later this month.  If her ejection fraction remains below 35% on medical therapy, we will consider a primary prevention ICD.  The indications for ICD's were discussed today.  I also provided shared decision making literature regarding primary prevention ICD's.

## 2025-02-07 NOTE — LETTER
February 7, 2025     Husam Palmer MD  [address]  [facility] [address]    Patient: Bren Carson   YOB: 1965   Date of Visit: 2/7/2025       Dear Dr. Husam Palmer MD:    Thank you for referring Bren Carson to me for evaluation. Below are my notes for this consultation.  If you have questions, please do not hesitate to call me. I look forward to following your patient along with you.       Sincerely,     James C Ramicone, DO      CC: Octavio Garcia, DO  ______________________________________________________________________________________          Reason For Consult:    Possible ICD insertion    History Of Present Illness:    This is a 59-year-old female with congestive heart failure.  She is being seen today in consultation at the request of Dr. Palmer.  The patient is being treated for congestive heart failure, and has a dilated nonischemic cardiomyopathy, ejection fraction 25%.  Overall she is feeling better with the addition of torsemide and spironolactone.  Cardiology records from Dr. Palmer reviewed today.    Past medical history:    Arthritis  GERD  Left total knee replacement    Social history: Non-smoker    Family history: Her mother had congestive heart failure, father had CAD    Review of systems:  Other review of systems negative other than as outlined in HPI    Social History:  She reports that she has never smoked. She has never used smokeless tobacco. She reports that she does not currently use alcohol. She reports that she does not use drugs.    Family History:  Family History   Problem Relation Name Age of Onset   • Other cancer Mother     • Coronary artery disease Father     • Cancer Paternal Grandfather         Allergies:  Doxycycline, Amitriptyline, Claritin [loratadine], Crestor [rosuvastatin], Levaquin [levofloxacin], Morphine, Pantoprazole, Prilosec [omeprazole], Rybelsus [semaglutide], and Simvastatin    Medications:  Current Outpatient  "Medications   Medication Instructions   • celecoxib (CeleBREX) 200 mg capsule 1 capsule, 2 times daily   • diazePAM (Valium) 5 mg tablet Take 30 minutes before procedure   • folic acid (Folvite) 1 mg tablet Take by mouth.   • hydroxychloroquine (PLAQUENIL) 200 mg, 2 times daily   • Klor-Con M20 20 mEq ER tablet 20 mEq, oral, Daily, do not crush, chew, or split   • losartan (COZAAR) 25 mg, oral, Daily   • METHOTREXATE SODIUM INJ 1 GM Injection Solution Reconstituted; Use as directed.   • metoprolol succinate XL (TOPROL-XL) 50 mg, oral, 2 times daily, Do not crush or chew.   • Mounjaro 5 mg, subcutaneous, Weekly   • multivitamin tablet Take by mouth.   • omeprazole (PRILOSEC) 40 mg, oral, Daily   • spironolactone (ALDACTONE) 25 mg, oral, Daily   • sulfaSALAzine (Azulfidine) 500 mg tablet 500mg once/day x1 week, then 500mg twice/day thereafter   • torsemide (DEMADEX) 20 mg, oral, Daily   • valACYclovir (VALTREX) 1,000 mg, oral, 3 times daily       Vitals:      1/10/2025     8:08 AM 1/10/2025     9:08 AM 1/14/2025     4:30 PM 1/29/2025     7:40 AM 1/29/2025     7:51 AM 1/31/2025     7:41 AM 2/3/2025     2:28 PM   Vitals   Systolic  118 104  131  83   Diastolic  84 74  85  56   BP Location  Left arm        Heart Rate  112 71  80  71   Temp   36.1 °C (96.9 °F) 2.6 °C (36.7 °F) 36.7 °C (98.1 °F)  36.8 °C (98.3 °F)   Resp   16  14  20   Height 1.549 m (5' 1\") 1.549 m (5' 1\") 1.549 m (5' 1\")  1.549 m (5' 1\") 1.524 m (5')    Weight (lb)  210 218  215.39 210 208   BMI 39.68 kg/m2 39.68 kg/m2 41.19 kg/m2  40.7 kg/m2 41.01 kg/m2 40.62 kg/m2   BSA (m2) 2.03 m2 2.03 m2 2.06 m2  2.05 m2 2.01 m2 2 m2   Visit Report Report Report Report    Report     Physical Exam:    General Appearance:  Alert, oriented, no distress  Skin:  Warm and dry  Head and Neck:  No elevation of JVP, no carotid bruits  Cardiac Exam:  Rhythm is regular, S1 and S2 are normal, no murmur S3 or S4  Lungs:  Clear to auscultation  Extremities:  no edema  Neurologic: " " No focal deficits  Psychiatric:  Appropriate mood and behavior    Lab Results:     CMP:  Recent Labs     01/10/25  1018 12/09/24  0910 02/26/24  1715 12/11/23  1045 03/21/23  1613 11/16/22  1004 02/09/21  1529 03/05/19  1547    138 140 144 139 140 140  --    K 3.4* 3.8 4.2 3.7 4.3 4.2 3.9  --    CL 98 92* 100 99 100 98 102  --    CO2 35* 34* 33* 36* 30 33* 30  --    ANIONGAP 11 16 11 13 13 13 12  --    BUN 18 26* 17 15 18 17 17 14   CREATININE 0.65 0.83 0.69 0.70 0.62 0.52 0.59 0.61   EGFR >90 81 >90 >90  --   --   --   --    MG  --   --   --   --   --  1.80  --   --      Recent Labs     02/26/24 1715 12/11/23  1045 03/21/23  1613 11/16/22  1004 02/09/21  1529   ALBUMIN 4.0 3.9 3.8 4.3 4.0   ALKPHOS 70 59 67 80 98   ALT 16 22 18 22 29   AST 18 24 16 18 20   BILITOT 0.4 1.0 0.3 0.6 0.3     CBC:  Recent Labs     01/10/25  1018 11/16/22  1004 02/09/21  1529 03/05/19  1547   WBC 7.2 9.7 10.3 9.6   HGB 14.7 16.0 14.3 14.7   HCT 44.7 50.6* 45.5 46.1*    323 354 327   MCV 93 93 97 95     COAG:   Recent Labs     01/10/25  1018 11/16/22  1004   INR 1.1  --    DDIMERVTE  --  585*     HEME/ENDO:  Recent Labs     11/12/24 1714 12/11/23  1045 03/21/23  1613   TSH  --   --  1.15   HGBA1C 5.6 7.0* 6.9*      CARDIAC:   Recent Labs     11/16/22  1319 11/16/22  1124 11/16/22  1004   TROPHS 27* 32* 32*   BNP  --   --  319*   No results for input(s): \"CHOL\", \"LDLF\", \"HDL\", \"TRIG\" in the last 29281 hours.    Test Results (personally reviewed):    Echocardiogram January 2025: Ejection fraction 25% with moderate mitral valve regurgitation.  Cardiac catheterization January 2025: Angiographically normal coronary arteries  EKG normal sinus rhythm, QRS duration 98 ms    Assessment/Plan  Problem List Items Addressed This Visit             ICD-10-CM    Chronic systolic heart failure - Primary I50.22     This patient has a dilated nonischemic cardiomyopathy, ejection fraction 25%.  She is euvolemic at this time.  She will be " considered for a primary prevention ICD if her ejection fraction does not improve.  As of January 10, 2025 her ejection fraction was 25%.  I have recommended the addition of Entresto, starting at 24/26 mg twice daily and this will be titrated up as tolerated.  The patient will be following up with Dr. Palmer as scheduled later this month.  If her ejection fraction remains below 35% on medical therapy, we will consider a primary prevention ICD.  The indications for ICD's were discussed today.  I also provided shared decision making literature regarding primary prevention ICD's.         Relevant Medications    sacubitriL-valsartan (Entresto) 24-26 mg tablet     James C Ramicone, DO

## 2025-02-10 ENCOUNTER — APPOINTMENT (OUTPATIENT)
Dept: PHARMACY | Facility: HOSPITAL | Age: 60
End: 2025-02-10
Payer: COMMERCIAL

## 2025-02-10 DIAGNOSIS — E66.01 MORBID OBESITY WITH BMI OF 40.0-44.9, ADULT (MULTI): ICD-10-CM

## 2025-02-10 DIAGNOSIS — E11.9 TYPE 2 DIABETES MELLITUS WITHOUT COMPLICATION, WITHOUT LONG-TERM CURRENT USE OF INSULIN (MULTI): ICD-10-CM

## 2025-02-10 RX ORDER — TIRZEPATIDE 5 MG/.5ML
5 INJECTION, SOLUTION SUBCUTANEOUS WEEKLY
Qty: 2 ML | Refills: 1 | Status: SHIPPED | OUTPATIENT
Start: 2025-02-10

## 2025-02-10 NOTE — PROGRESS NOTES
"Subjective     Patient ID: Bren Carson is a 59 y.o. female who presents for diabetes management.    HPI    Allergies   Allergen Reactions    Doxycycline GI Upset     Severe GI upset    Amitriptyline Other     Reactions: \"Knocked me out\"    Claritin [Loratadine] Unknown    Crestor [Rosuvastatin] Unknown    Levaquin [Levofloxacin] Unknown    Morphine Unknown    Pantoprazole Unknown    Prilosec [Omeprazole] Unknown    Rybelsus [Semaglutide] Other     Stomach cramping, diarrhea and emesis    Simvastatin Unknown     Objective     Current DM Pharmacotherapy:   - Mounjaro 5 mg weekly    SECONDARY PREVENTION  - Statin? No  - ACE-I/ARB? No  - Aspirin? No    Pertinent PMH Review:  - PMH of Pancreatitis: No  - PMH/FH of Medullary Thyroid Cancer: No  - PMH of Retinopathy: No  - PMH of Urinary Tract Infections: No    Lab Review  Lab Results   Component Value Date    BILITOT 0.4 02/26/2024    CALCIUM 9.1 01/10/2025    CO2 35 (H) 01/10/2025    CL 98 01/10/2025    CREATININE 0.65 01/10/2025    GLUCOSE 81 01/10/2025    ALKPHOS 70 02/26/2024    K 3.4 (L) 01/10/2025    PROT 6.8 02/26/2024     01/10/2025    AST 18 02/26/2024    ALT 16 02/26/2024    BUN 18 01/10/2025    ANIONGAP 11 01/10/2025    MG 1.80 11/16/2022    ALBUMIN 4.0 02/26/2024    GFRF >90 03/21/2023     No results found for: \"TRIG\", \"CHOL\", \"LDL\", \"LDLCALC\", \"HDL\"  Lab Results   Component Value Date    HGBA1C 5.6 11/12/2024     The ASCVD Risk score (Bushra DK, et al., 2019) failed to calculate for the following reasons:    Cannot find a previous HDL lab    Cannot find a previous total cholesterol lab    Assessment/Plan     Problem List Items Addressed This Visit       Morbid obesity with BMI of 40.0-44.9, adult (Multi)     Other Visit Diagnoses       Type 2 diabetes mellitus without complication, without long-term current use of insulin (Multi)            Patient has had some increased nausea over the past 2 weeks. Likely due to starting valacyclovir for " shingles. Will hold the Mounjaro at 5 mg for another month to see nausea resolves over next month. Will follow up in 4 weeks.    Type 2 diabetes mellitus, is at goal. <7%    CONTINUE Mounjaro 5 mg weekly  Follow up: I recommend diabetes care be 4 weeks.    Felicia Gonzalez, PharmD    Continue all meds under the continuation of care with the referring provider and clinical pharmacy team

## 2025-02-20 DIAGNOSIS — I42.8 OTHER CARDIOMYOPATHIES: ICD-10-CM

## 2025-02-20 DIAGNOSIS — I50.22 CHRONIC SYSTOLIC HEART FAILURE: ICD-10-CM

## 2025-02-20 RX ORDER — SPIRONOLACTONE 25 MG/1
25 TABLET ORAL DAILY
Qty: 90 TABLET | Refills: 3 | Status: SHIPPED | OUTPATIENT
Start: 2025-02-20

## 2025-02-21 DIAGNOSIS — I50.22 CHRONIC SYSTOLIC CONGESTIVE HEART FAILURE, NYHA CLASS 2: ICD-10-CM

## 2025-02-24 RX ORDER — METOPROLOL SUCCINATE 50 MG/1
50 TABLET, EXTENDED RELEASE ORAL 2 TIMES DAILY
Qty: 180 TABLET | Refills: 3 | Status: SHIPPED | OUTPATIENT
Start: 2025-02-24 | End: 2025-02-28 | Stop reason: SDUPTHER

## 2025-02-26 ENCOUNTER — TELEPHONE (OUTPATIENT)
Dept: CARDIOLOGY | Facility: CLINIC | Age: 60
End: 2025-02-26
Payer: COMMERCIAL

## 2025-02-26 DIAGNOSIS — E87.6 HYPOKALEMIA: ICD-10-CM

## 2025-02-28 ENCOUNTER — OFFICE VISIT (OUTPATIENT)
Dept: CARDIOLOGY | Facility: CLINIC | Age: 60
End: 2025-02-28
Payer: COMMERCIAL

## 2025-02-28 VITALS
WEIGHT: 219 LBS | OXYGEN SATURATION: 94 % | DIASTOLIC BLOOD PRESSURE: 80 MMHG | HEIGHT: 60 IN | HEART RATE: 61 BPM | BODY MASS INDEX: 43 KG/M2 | SYSTOLIC BLOOD PRESSURE: 108 MMHG

## 2025-02-28 DIAGNOSIS — R94.31 ABNORMAL EKG: ICD-10-CM

## 2025-02-28 DIAGNOSIS — I50.32 CHRONIC DIASTOLIC CONGESTIVE HEART FAILURE: ICD-10-CM

## 2025-02-28 DIAGNOSIS — R60.0 EDEMA OF BOTH LEGS: ICD-10-CM

## 2025-02-28 DIAGNOSIS — I10 HYPERTENSION, ESSENTIAL, BENIGN: ICD-10-CM

## 2025-02-28 DIAGNOSIS — I50.22 CHRONIC SYSTOLIC CONGESTIVE HEART FAILURE, NYHA CLASS 2: ICD-10-CM

## 2025-02-28 DIAGNOSIS — I34.0 MODERATE MITRAL REGURGITATION: Primary | ICD-10-CM

## 2025-02-28 DIAGNOSIS — I42.8 OTHER CARDIOMYOPATHIES: ICD-10-CM

## 2025-02-28 DIAGNOSIS — E78.2 HYPERLIPIDEMIA, MIXED: ICD-10-CM

## 2025-02-28 DIAGNOSIS — I50.22 CHRONIC SYSTOLIC HEART FAILURE: ICD-10-CM

## 2025-02-28 PROCEDURE — 99214 OFFICE O/P EST MOD 30 MIN: CPT | Performed by: INTERNAL MEDICINE

## 2025-02-28 PROCEDURE — 3008F BODY MASS INDEX DOCD: CPT | Performed by: INTERNAL MEDICINE

## 2025-02-28 PROCEDURE — 3074F SYST BP LT 130 MM HG: CPT | Performed by: INTERNAL MEDICINE

## 2025-02-28 PROCEDURE — 3079F DIAST BP 80-89 MM HG: CPT | Performed by: INTERNAL MEDICINE

## 2025-02-28 PROCEDURE — 1036F TOBACCO NON-USER: CPT | Performed by: INTERNAL MEDICINE

## 2025-02-28 RX ORDER — METOPROLOL SUCCINATE 50 MG/1
50 TABLET, EXTENDED RELEASE ORAL 2 TIMES DAILY
Qty: 180 TABLET | Refills: 3 | Status: SHIPPED | OUTPATIENT
Start: 2025-02-28 | End: 2026-02-28

## 2025-02-28 RX ORDER — POTASSIUM CHLORIDE 20 MEQ/1
20 TABLET, EXTENDED RELEASE ORAL DAILY
Qty: 90 TABLET | Refills: 3 | Status: SHIPPED | OUTPATIENT
Start: 2025-02-28 | End: 2026-02-28

## 2025-02-28 RX ORDER — SPIRONOLACTONE 50 MG/1
50 TABLET, FILM COATED ORAL DAILY
Qty: 90 TABLET | Refills: 3 | Status: SHIPPED | OUTPATIENT
Start: 2025-02-28

## 2025-02-28 RX ORDER — TORSEMIDE 20 MG/1
20 TABLET ORAL DAILY
Qty: 90 TABLET | Refills: 3 | Status: SHIPPED | OUTPATIENT
Start: 2025-02-28 | End: 2026-02-28

## 2025-02-28 ASSESSMENT — ENCOUNTER SYMPTOMS
BLOATING: 1
DYSPNEA ON EXERTION: 1

## 2025-02-28 NOTE — PROGRESS NOTES
Subjective   Bren Carson is a 59 y.o. female.    Chief Complaint:  Follow-up congestive heart failure.  Follow-up cardiac catheterization.  Shortness of breath and weight gain.    HPI    Since her last visit she underwent cardiac catheterization.  She is here for follow-up of the results.  She attempted to do the MRI but became extremely claustrophobic with the procedure.  This had to be canceled.  Had recent blood work and was found to have low potassium.  Has noted recent increase shortness of breath and increased weight gain.  Mildly increased peripheral edema but she feels that her abdomen is more distended.    Cardiac catheterization performed on 2024 demonstrated normal coronary arteries with elevated pulmonary artery pressures and elevated end-diastolic pressures.    Her past cardiac history is unremarkable.  No history of hypertension.  No history of an acute coronary event.  Reports normal cholesterol levels.     Past medical history: Significant for degenerative arthritis and osteoarthritis.  Carries a diagnosis of seronegative rheumatoid arthritis.  History of colitis.  History of gastroesophageal reflux disease.  History of left total knee replacement.     Allergies to medications: Amitriptyline, doxycycline, rosuvastatin, Claritin, morphine, Levaquin, Protonix, Prilosec, Rybelsus, simvastatin.     Social history: She is a non-smoker and has never smoked.  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   with 2 children.     Family history: Mother  of congestive heart failure at the age of 80.  Father  during a coronary artery bypass grafting procedure.    Review of Systems   Constitutional: Positive for malaise/fatigue.   Cardiovascular:  Positive for dyspnea on exertion and leg swelling.   Gastrointestinal:  Positive for bloating.       Current Outpatient Medications   Medication Sig Dispense Refill    celecoxib (CeleBREX) 200  mg capsule Take 1 capsule (200 mg) by mouth 2 times a day.      diazePAM (Valium) 5 mg tablet Take 30 minutes before procedure 1 tablet 0    folic acid (Folvite) 1 mg tablet Take by mouth.      hydroxychloroquine (Plaquenil) 200 mg tablet Take 1 tablet (200 mg) by mouth twice a day.      Klor-Con M20 20 mEq ER tablet TAKE 1 TABLET (20 MEQ) BY MOUTH ONCE DAILY. DO NOT CRUSH, CHEW, OR SPLIT. 30 tablet 2    METHOTREXATE SODIUM INJ 1 GM Injection Solution Reconstituted; Use as directed.      metoprolol succinate XL (Toprol-XL) 50 mg 24 hr tablet TAKE 1 TABLET (50 MG) BY MOUTH 2 TIMES A DAY. DO NOT CRUSH OR CHEW. 180 tablet 3    multivitamin tablet Take by mouth.      omeprazole (PriLOSEC) 40 mg DR capsule Take 1 capsule (40 mg) by mouth once daily. 90 capsule 1    sacubitriL-valsartan (Entresto) 24-26 mg tablet Take 1 tablet by mouth 2 times a day. 60 tablet 11    spironolactone (Aldactone) 25 mg tablet TAKE 1 TABLET BY MOUTH EVERY DAY 90 tablet 3    sulfaSALAzine (Azulfidine) 500 mg tablet 500mg once/day x1 week, then 500mg twice/day thereafter      tirzepatide (Mounjaro) 5 mg/0.5 mL pen injector Inject 5 mg under the skin 1 (one) time per week. 2 mL 1    torsemide (Demadex) 20 mg tablet Take 1 tablet (20 mg) by mouth once daily. 90 tablet 3     No current facility-administered medications for this visit.        Visit Vitals  LMP  (LMP Unknown)   OB Status Postmenopausal   Smoking Status Never        Objective     Constitutional:       Appearance: Not in distress.   Neck:      Vascular: JVD normal.   Pulmonary:      Breath sounds: Normal breath sounds.   Cardiovascular:      Normal rate. Regular rhythm. Normal S1. Normal S2.       Murmurs: There is no murmur.      No gallop.    Pulses:     Intact distal pulses.   Edema:     Peripheral edema present.     Pretibial: bilateral 1+ edema of the pretibial area.  Abdominal:      General: There is no distension.      Palpations: Abdomen is soft.   Neurological:      Mental  Status: Alert.         Lab Review:   Lab Results   Component Value Date     01/10/2025    K 3.4 (L) 01/10/2025    CL 98 01/10/2025    CO2 35 (H) 01/10/2025    BUN 18 01/10/2025    CREATININE 0.65 01/10/2025    GLUCOSE 81 01/10/2025    CALCIUM 9.1 01/10/2025     Lab Results   Component Value Date    WBC 7.2 01/10/2025    HGB 14.7 01/10/2025    HCT 44.7 01/10/2025    MCV 93 01/10/2025     01/10/2025         Assessment:    1.  Systolic congestive heart failure.  Somewhat worsening congestive heart failure based on weights.  Weight is up 11 pounds.  Will increase spironolactone to 50 mg daily continue torsemide 20 mg daily we are going to see her back in April and do a limited echo study.  If there is no improvement in left ventricular function she will be referred to the electrophysiology service for possible ICD.    2.  Hypokalemia.  Latest potassium was 3.0.  We have increased to spironolactone.  Also added potassium supplements.    3.  Dilated cardiomyopathy.  Etiology unclear.    4.  Moderate mitral regurgitation.  Blood pressures are low.    3.

## 2025-02-28 NOTE — PATIENT INSTRUCTIONS
Take spironolactone 50 mg daily.    Take torsemide 20 mg daily.    We need you to get blood tests in one week.    Continue to monitor your weights at home.

## 2025-02-28 NOTE — TELEPHONE ENCOUNTER
Answering service message from 4:11pm 2/26/25 :  PT returning Nurse's call to report Potassium level was 3.0  
Bren called to say her Rheumatologist  wanted Dr. Palmer to know that her potassium was very low. She can be reached at;    [phone]  
Will need folllowup BMP in 1-2 weeks
20

## 2025-03-04 ENCOUNTER — TELEPHONE (OUTPATIENT)
Dept: CARDIOLOGY | Facility: CLINIC | Age: 60
End: 2025-03-04

## 2025-03-05 NOTE — TELEPHONE ENCOUNTER
"Your heart function is significantly reduced.  This is called a \"cardiomyopathy\".  We do not know why it is reduced and we have ordered an MRI study to find out.  The leaky valve is secondary to the cardiomyopathy and may improve in the future."

## 2025-03-08 LAB
NON-UH HIE B-TYPE NATRIURETIC PEPTIDE: 522 PG/ML (ref 0–100)
NON-UH HIE BUN/CREAT RATIO: 16.2
NON-UH HIE BUN: 13 MG/DL (ref 9–23)
NON-UH HIE CALCIUM: 9.6 MG/DL (ref 8.7–10.4)
NON-UH HIE CALCULATED OSMOLALITY: 284 MOSM/KG (ref 275–295)
NON-UH HIE CHLORIDE: 103 MMOL/L (ref 98–107)
NON-UH HIE CO2, VENOUS: 34 MMOL/L (ref 20–31)
NON-UH HIE CREATININE: 0.8 MG/DL (ref 0.5–0.8)
NON-UH HIE GFR AA: >60
NON-UH HIE GLOMERULAR FILTRATION RATE: >60 ML/MIN/1.73M?
NON-UH HIE GLUCOSE: 78 MG/DL (ref 74–106)
NON-UH HIE K: 4.7 MMOL/L (ref 3.5–5.1)
NON-UH HIE NA: 143 MMOL/L (ref 135–145)

## 2025-03-10 ENCOUNTER — APPOINTMENT (OUTPATIENT)
Dept: PHARMACY | Facility: HOSPITAL | Age: 60
End: 2025-03-10
Payer: COMMERCIAL

## 2025-03-10 DIAGNOSIS — E11.9 TYPE 2 DIABETES MELLITUS WITHOUT COMPLICATION, WITHOUT LONG-TERM CURRENT USE OF INSULIN (MULTI): ICD-10-CM

## 2025-03-11 RX ORDER — TIRZEPATIDE 7.5 MG/.5ML
7.5 INJECTION, SOLUTION SUBCUTANEOUS WEEKLY
Qty: 2 ML | Refills: 0 | Status: SHIPPED | OUTPATIENT
Start: 2025-03-11

## 2025-03-12 NOTE — PROGRESS NOTES
"Subjective     Patient ID: Bren Carson is a 60 y.o. female who presents for diabetes management.    HPI    Allergies   Allergen Reactions    Doxycycline GI Upset     Severe GI upset    Amitriptyline Other     Reactions: \"Knocked me out\"    Claritin [Loratadine] Unknown    Crestor [Rosuvastatin] Unknown    Levaquin [Levofloxacin] Unknown    Morphine Unknown    Pantoprazole Unknown    Prilosec [Omeprazole] Unknown    Rybelsus [Semaglutide] Other     Stomach cramping, diarrhea and emesis    Simvastatin Unknown     Objective     Current DM Pharmacotherapy:   - Mounjaro 5 mg weekly    SECONDARY PREVENTION  - Statin? No  - ACE-I/ARB? No  - Aspirin? No    Pertinent PMH Review:  - PMH of Pancreatitis: No  - PMH/FH of Medullary Thyroid Cancer: No  - PMH of Retinopathy: No  - PMH of Urinary Tract Infections: No    Lab Review  Lab Results   Component Value Date    BILITOT 0.4 02/26/2024    CALCIUM 9.1 01/10/2025    CO2 35 (H) 01/10/2025    CL 98 01/10/2025    CREATININE 0.65 01/10/2025    GLUCOSE 81 01/10/2025    ALKPHOS 70 02/26/2024    K 3.4 (L) 01/10/2025    PROT 6.8 02/26/2024     01/10/2025    AST 18 02/26/2024    ALT 16 02/26/2024    BUN 18 01/10/2025    ANIONGAP 11 01/10/2025    MG 1.80 11/16/2022    ALBUMIN 4.0 02/26/2024    GFRF >90 03/21/2023     No results found for: \"TRIG\", \"CHOL\", \"LDL\", \"LDLCALC\", \"HDL\"  Lab Results   Component Value Date    HGBA1C 5.6 11/12/2024     The ASCVD Risk score ([place] DK, et al., 2019) failed to calculate for the following reasons:    Cannot find a previous HDL lab    Cannot find a previous total cholesterol lab    Assessment/Plan     Problem List Items Addressed This Visit    None  Visit Diagnoses       Type 2 diabetes mellitus without complication, without long-term current use of insulin (Multi)            Patient reports no issues with Mounjaro at this time. She reports some weight gain with continuing on 5 mg dose. Will increase Mounjaro to 7.5 mg dose and will " follow up in 4 weeks.    Type 2 diabetes mellitus, is at goal. <7%    INCREASE Mounjaro to 7.5 mg weekly  Follow up: I recommend diabetes care be 4 weeks.    Felicia Gonzalez, PharmD    Continue all meds under the continuation of care with the referring provider and clinical pharmacy team

## 2025-03-26 NOTE — PROGRESS NOTES
Subjective   Bren Carson is a 60 y.o. female.    Chief Complaint:  Follow-up congestive heart failure.  Exertional dyspnea.  Vertigo.    HPI    She is here for follow-up of her dilated cardiomyopathy.  She was unable to do a cardiac MRI because of claustrophobic reasons.  Feels better.  Has better energy levels and better exercise tolerance.  Has developed vertigo.  This is classic vertigo with the room spinning and it is positional.  Particularly occurs when she is laying in bed and turns.    Cardiac catheterization performed on 2024 demonstrated normal coronary arteries with elevated pulmonary artery pressures and elevated end-diastolic pressures.     Her past cardiac history is unremarkable.  No history of hypertension.  No history of an acute coronary event.  Reports normal cholesterol levels.     Past medical history: Significant for degenerative arthritis and osteoarthritis.  Carries a diagnosis of seronegative rheumatoid arthritis.  History of colitis.  History of gastroesophageal reflux disease.  History of left total knee replacement.     Allergies to medications: Amitriptyline, doxycycline, rosuvastatin, Claritin, morphine, Levaquin, Protonix, Prilosec, Rybelsus, simvastatin.     Social history: She is a non-smoker and has never smoked.  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   with 2 children.     Family history: Mother  of congestive heart failure at the age of 80.  Father  during a coronary artery bypass grafting procedure.     Review of Systems   Cardiovascular:  Positive for dyspnea on exertion.   Neurological:  Positive for vertigo.       Current Outpatient Medications   Medication Sig Dispense Refill    celecoxib (CeleBREX) 200 mg capsule Take 1 capsule (200 mg) by mouth 2 times a day.      folic acid (Folvite) 1 mg tablet Take by mouth.      hydroxychloroquine (Plaquenil) 200 mg tablet Take 1 tablet (200 mg) by mouth  twice a day.      METHOTREXATE SODIUM INJ 1 GM Injection Solution Reconstituted; Use as directed.      multivitamin tablet Take by mouth.      omeprazole (PriLOSEC) 40 mg DR capsule Take 1 capsule (40 mg) by mouth once daily. 90 capsule 1    potassium chloride CR (Klor-Con M20) 20 mEq ER tablet Take 1 tablet (20 mEq) by mouth once daily. do not crush, chew, or split 90 tablet 3    sulfaSALAzine (Azulfidine) 500 mg tablet 500mg once/day x1 week, then 500mg twice/day thereafter      tirzepatide (Mounjaro) 7.5 mg/0.5 mL pen injector Inject 7.5 mg under the skin 1 (one) time per week. 2 mL 0    metoprolol succinate XL (Toprol-XL) 50 mg 24 hr tablet Take 1 tablet (50 mg) by mouth 2 times a day. Do not crush or chew. 180 tablet 3    sacubitriL-valsartan (Entresto) 24-26 mg tablet Take 1 tablet by mouth 2 times a day. 180 tablet 3    spironolactone (Aldactone) 25 mg tablet Take 1 tablet (25 mg) by mouth once daily. 90 tablet 3    torsemide (Demadex) 20 mg tablet Take 1 tablet (20 mg) by mouth once daily. 90 tablet 3     No current facility-administered medications for this visit.        Visit Vitals  /80 (BP Location: Right arm, Patient Position: Sitting)   Ht 1.524 m (5')   Wt 93.4 kg (206 lb)   LMP  (LMP Unknown)   BMI 40.23 kg/m²   OB Status Postmenopausal   Smoking Status Never   BSA 1.99 m²        Objective     Constitutional:       Appearance: Not in distress.   Neck:      Vascular: JVD normal.   Pulmonary:      Breath sounds: Normal breath sounds.   Cardiovascular:      Normal rate. Regular rhythm. Normal S1. Normal S2.       Murmurs: There is no murmur.      No gallop.    Pulses:     Intact distal pulses.   Edema:     Peripheral edema absent.   Abdominal:      General: There is no distension.      Palpations: Abdomen is soft.   Neurological:      Mental Status: Alert.         Lab Review:   Lab Results   Component Value Date     01/10/2025    K 3.4 (L) 01/10/2025    CL 98 01/10/2025    CO2 35 (H)  01/10/2025    BUN 18 01/10/2025    CREATININE 0.65 01/10/2025    GLUCOSE 81 01/10/2025    CALCIUM 9.1 01/10/2025     Lab Results   Component Value Date    WBC 7.2 01/10/2025    HGB 14.7 01/10/2025    HCT 44.7 01/10/2025    MCV 93 01/10/2025     01/10/2025       Assessment:    1.  Dilated cardiomyopathy.  Today's echocardiographic study shows significant improvement in left ventricular function.  We compared images from her prior study dating back to November 2024 where her left ventricular function was about 25%.  Currently has improved to 40%.  BNP remains elevated at 522.  Will continue diuretic therapy.  However potassium levels are increasing so we will reduce the spironolactone to 25 mg daily.  Since she has had improvement in left ventricular function at this point I do not think that we need to proceed with a cardiac MRI.    2.  Moderate mitral regurgitation.  On today's echo study this is improved.  Now in the mild range.

## 2025-03-27 ENCOUNTER — OFFICE VISIT (OUTPATIENT)
Dept: CARDIOLOGY | Facility: CLINIC | Age: 60
End: 2025-03-27
Payer: COMMERCIAL

## 2025-03-27 ENCOUNTER — HOSPITAL ENCOUNTER (OUTPATIENT)
Dept: CARDIOLOGY | Facility: CLINIC | Age: 60
Discharge: HOME | End: 2025-03-27
Payer: COMMERCIAL

## 2025-03-27 VITALS
SYSTOLIC BLOOD PRESSURE: 120 MMHG | BODY MASS INDEX: 40.44 KG/M2 | DIASTOLIC BLOOD PRESSURE: 80 MMHG | WEIGHT: 206 LBS | HEIGHT: 60 IN

## 2025-03-27 DIAGNOSIS — I50.22 CHRONIC SYSTOLIC HEART FAILURE: ICD-10-CM

## 2025-03-27 DIAGNOSIS — I49.1 PAC (PREMATURE ATRIAL CONTRACTION): ICD-10-CM

## 2025-03-27 DIAGNOSIS — I34.0 MODERATE MITRAL REGURGITATION: ICD-10-CM

## 2025-03-27 DIAGNOSIS — I34.0 MITRAL VALVE INSUFFICIENCY, UNSPECIFIED ETIOLOGY: ICD-10-CM

## 2025-03-27 DIAGNOSIS — R94.31 ABNORMAL EKG: ICD-10-CM

## 2025-03-27 DIAGNOSIS — I50.22 CHRONIC SYSTOLIC CONGESTIVE HEART FAILURE, NYHA CLASS 2: ICD-10-CM

## 2025-03-27 DIAGNOSIS — I10 HYPERTENSION, ESSENTIAL, BENIGN: ICD-10-CM

## 2025-03-27 DIAGNOSIS — H81.10 BENIGN PAROXYSMAL POSITIONAL VERTIGO, UNSPECIFIED LATERALITY: Primary | ICD-10-CM

## 2025-03-27 DIAGNOSIS — E78.2 HYPERLIPIDEMIA, MIXED: ICD-10-CM

## 2025-03-27 DIAGNOSIS — R60.0 EDEMA OF BOTH LEGS: ICD-10-CM

## 2025-03-27 PROBLEM — R00.0 SINUS TACHYCARDIA: Status: RESOLVED | Noted: 2023-03-17 | Resolved: 2025-03-27

## 2025-03-27 PROCEDURE — 3079F DIAST BP 80-89 MM HG: CPT | Performed by: INTERNAL MEDICINE

## 2025-03-27 PROCEDURE — 99214 OFFICE O/P EST MOD 30 MIN: CPT | Performed by: INTERNAL MEDICINE

## 2025-03-27 PROCEDURE — 3008F BODY MASS INDEX DOCD: CPT | Performed by: INTERNAL MEDICINE

## 2025-03-27 PROCEDURE — 1036F TOBACCO NON-USER: CPT | Performed by: INTERNAL MEDICINE

## 2025-03-27 PROCEDURE — 99214 OFFICE O/P EST MOD 30 MIN: CPT | Mod: 25 | Performed by: INTERNAL MEDICINE

## 2025-03-27 PROCEDURE — 93308 TTE F-UP OR LMTD: CPT | Performed by: INTERNAL MEDICINE

## 2025-03-27 PROCEDURE — 93308 TTE F-UP OR LMTD: CPT

## 2025-03-27 PROCEDURE — 3074F SYST BP LT 130 MM HG: CPT | Performed by: INTERNAL MEDICINE

## 2025-03-27 RX ORDER — SPIRONOLACTONE 25 MG/1
25 TABLET ORAL DAILY
Qty: 90 TABLET | Refills: 3 | Status: SHIPPED | OUTPATIENT
Start: 2025-03-27 | End: 2026-03-27

## 2025-03-27 RX ORDER — TORSEMIDE 20 MG/1
20 TABLET ORAL DAILY
Qty: 90 TABLET | Refills: 3 | Status: SHIPPED | OUTPATIENT
Start: 2025-03-27 | End: 2026-03-27

## 2025-03-27 RX ORDER — METOPROLOL SUCCINATE 50 MG/1
50 TABLET, EXTENDED RELEASE ORAL 2 TIMES DAILY
Qty: 180 TABLET | Refills: 3 | Status: SHIPPED | OUTPATIENT
Start: 2025-03-27 | End: 2026-03-27

## 2025-03-27 ASSESSMENT — ENCOUNTER SYMPTOMS
VERTIGO: 1
DYSPNEA ON EXERTION: 1

## 2025-03-27 NOTE — PATIENT INSTRUCTIONS
Reduce the spironolactone to 25 mg daily    Your heart function is significantly improved.    We will send you to physical therapy for your vertigo.

## 2025-03-28 LAB
AORTIC VALVE PEAK VELOCITY: 1.77 M/S
AV PEAK GRADIENT: 13 MMHG
EJECTION FRACTION APICAL 4 CHAMBER: 35.5
EJECTION FRACTION: 43 %
LEFT VENTRICLE INTERNAL DIMENSION DIASTOLE: 4.42 CM (ref 3.5–6)
MITRAL VALVE E/A RATIO: 0.53
RIGHT VENTRICLE FREE WALL PEAK S': 0.12 CM/S
RIGHT VENTRICLE PEAK SYSTOLIC PRESSURE: 18.5 MMHG
TRICUSPID ANNULAR PLANE SYSTOLIC EXCURSION: 1.9 CM

## 2025-04-08 ENCOUNTER — APPOINTMENT (OUTPATIENT)
Dept: PHARMACY | Facility: HOSPITAL | Age: 60
End: 2025-04-08
Payer: COMMERCIAL

## 2025-04-08 DIAGNOSIS — E11.9 TYPE 2 DIABETES MELLITUS WITHOUT COMPLICATION, WITHOUT LONG-TERM CURRENT USE OF INSULIN: ICD-10-CM

## 2025-04-08 RX ORDER — TIRZEPATIDE 10 MG/.5ML
10 INJECTION, SOLUTION SUBCUTANEOUS WEEKLY
Qty: 2 ML | Refills: 0 | Status: SHIPPED | OUTPATIENT
Start: 2025-04-08

## 2025-04-09 NOTE — PROGRESS NOTES
"Subjective     Patient ID: Bren Carson is a 60 y.o. female who presents for diabetes management.    HPI    Allergies   Allergen Reactions    Doxycycline GI Upset     Severe GI upset    Amitriptyline Other     Reactions: \"Knocked me out\"    Claritin [Loratadine] Unknown    Crestor [Rosuvastatin] Unknown    Levaquin [Levofloxacin] Unknown    Morphine Unknown    Pantoprazole Unknown    Prilosec [Omeprazole] Unknown    Rybelsus [Semaglutide] Other     Stomach cramping, diarrhea and emesis    Simvastatin Unknown     Objective     Current DM Pharmacotherapy:   - Mounjaro 7.5 mg weekly    SECONDARY PREVENTION  - Statin? No  - ACE-I/ARB? No  - Aspirin? No    Pertinent PMH Review:  - PMH of Pancreatitis: No  - PMH/FH of Medullary Thyroid Cancer: No  - PMH of Retinopathy: No  - PMH of Urinary Tract Infections: No    Lab Review  Lab Results   Component Value Date    BILITOT 0.4 02/26/2024    CALCIUM 9.1 01/10/2025    CO2 35 (H) 01/10/2025    CL 98 01/10/2025    CREATININE 0.65 01/10/2025    GLUCOSE 81 01/10/2025    ALKPHOS 70 02/26/2024    K 3.4 (L) 01/10/2025    PROT 6.8 02/26/2024     01/10/2025    AST 18 02/26/2024    ALT 16 02/26/2024    BUN 18 01/10/2025    ANIONGAP 11 01/10/2025    MG 1.80 11/16/2022    ALBUMIN 4.0 02/26/2024    GFRF >90 03/21/2023     No results found for: \"TRIG\", \"CHOL\", \"LDL\", \"LDLCALC\", \"HDL\"  Lab Results   Component Value Date    HGBA1C 5.6 11/12/2024     The ASCVD Risk score ([place] DK, et al., 2019) failed to calculate for the following reasons:    Cannot find a previous HDL lab    Cannot find a previous total cholesterol lab    Assessment/Plan     Problem List Items Addressed This Visit    None  Visit Diagnoses       Type 2 diabetes mellitus without complication, without long-term current use of insulin            Patient reports no issues with Mounjaro at this time. She reports weight loss is starting again. Will increase Mounjaro to 10 mg dose and will follow up in 4 " weeks.    Type 2 diabetes mellitus, is at goal. <7%    INCREASE Mounjaro to 10 mg weekly  Follow up: I recommend diabetes care be 4 weeks.    Felicia Gonzalez, PharmD    Continue all meds under the continuation of care with the referring provider and clinical pharmacy team

## 2025-04-15 DIAGNOSIS — I50.22 CHRONIC SYSTOLIC CONGESTIVE HEART FAILURE, NYHA CLASS 2: ICD-10-CM

## 2025-04-28 ENCOUNTER — TELEPHONE (OUTPATIENT)
Dept: CARDIOLOGY | Facility: CLINIC | Age: 60
End: 2025-04-28
Payer: COMMERCIAL

## 2025-04-28 NOTE — TELEPHONE ENCOUNTER
Pt called in said she has gained a pound a day for the last 13 days, She is retaining fluid would like to see what  can do?

## 2025-04-28 NOTE — TELEPHONE ENCOUNTER
Called pt to discuss. Pt states she has gained 13 lbs in 14 days. Pt c/o edema to BLE's L>R, can feel swelling in abdomen and thighs. Denies SOB right now. Pt states she is walking slower. No home BP/HR readings.    Meds: Metoprolol Succinate 50mg twice a day, Entresto 24/26mg twice a day, Spironolactone 25mg daily, Torsemide 20mg daily, KCL 20meq daily.    Next ov 9/24/25 w/echo.    Please advise. Thanks.

## 2025-04-29 NOTE — TELEPHONE ENCOUNTER
Pt returned call and notified regarding increasing Torsemide to 40mg (2 tabs, 20mg) daily for 1 week and Spironolactone 25mg daily. Instructed pt to call me in 1 week with an update. Pt verbalizes understanding of all instructions and information provided.

## 2025-04-29 NOTE — TELEPHONE ENCOUNTER
Increase the torsemide to 40 mg for one week and spironolactone 25 mg daily.  Call next week with update.

## 2025-05-06 ENCOUNTER — TELEPHONE (OUTPATIENT)
Dept: CARDIOLOGY | Facility: CLINIC | Age: 60
End: 2025-05-06

## 2025-05-06 ENCOUNTER — APPOINTMENT (OUTPATIENT)
Dept: PHARMACY | Facility: HOSPITAL | Age: 60
End: 2025-05-06
Payer: COMMERCIAL

## 2025-05-06 NOTE — TELEPHONE ENCOUNTER
Pt called to provide update. Pt states she lost 12 of 13 lbs gained when she doubled Torsemide for a few days and took Spironolactone 25mg daily.    Pt stated she will resume Torsemide 20mg daily. Should she continue Spironolactone 25mg daily?    Please advise. Thanks.

## 2025-05-14 ENCOUNTER — TELEMEDICINE (OUTPATIENT)
Dept: PHARMACY | Facility: HOSPITAL | Age: 60
End: 2025-05-14
Payer: COMMERCIAL

## 2025-05-14 DIAGNOSIS — E11.9 TYPE 2 DIABETES MELLITUS WITHOUT COMPLICATION, WITHOUT LONG-TERM CURRENT USE OF INSULIN: ICD-10-CM

## 2025-05-14 RX ORDER — TIRZEPATIDE 12.5 MG/.5ML
12.5 INJECTION, SOLUTION SUBCUTANEOUS WEEKLY
Qty: 2 ML | Refills: 0 | Status: SHIPPED | OUTPATIENT
Start: 2025-05-14

## 2025-05-15 NOTE — PROGRESS NOTES
"Subjective     Patient ID: Bren Carson is a 60 y.o. female who presents for diabetes management.    HPI    Allergies   Allergen Reactions    Doxycycline GI Upset     Severe GI upset    Amitriptyline Other     Reactions: \"Knocked me out\"    Claritin [Loratadine] Unknown    Crestor [Rosuvastatin] Unknown    Levaquin [Levofloxacin] Unknown    Morphine Unknown    Pantoprazole Unknown    Prilosec [Omeprazole] Unknown    Rybelsus [Semaglutide] Other     Stomach cramping, diarrhea and emesis    Simvastatin Unknown     Objective     Current DM Pharmacotherapy:   - Mounjaro 10 mg weekly    SECONDARY PREVENTION  - Statin? No  - ACE-I/ARB? No  - Aspirin? No    Pertinent PMH Review:  - PMH of Pancreatitis: No  - PMH/FH of Medullary Thyroid Cancer: No  - PMH of Retinopathy: No  - PMH of Urinary Tract Infections: No    Lab Review  Lab Results   Component Value Date    BILITOT 0.4 02/26/2024    CALCIUM 9.1 01/10/2025    CO2 35 (H) 01/10/2025    CL 98 01/10/2025    CREATININE 0.65 01/10/2025    GLUCOSE 81 01/10/2025    ALKPHOS 70 02/26/2024    K 3.4 (L) 01/10/2025    PROT 6.8 02/26/2024     01/10/2025    AST 18 02/26/2024    ALT 16 02/26/2024    BUN 18 01/10/2025    ANIONGAP 11 01/10/2025    MG 1.80 11/16/2022    ALBUMIN 4.0 02/26/2024    GFRF >90 03/21/2023     No results found for: \"TRIG\", \"CHOL\", \"LDL\", \"LDLCALC\", \"HDL\"  Lab Results   Component Value Date    HGBA1C 5.6 11/12/2024     The ASCVD Risk score ([place] DK, et al., 2019) failed to calculate for the following reasons:    Cannot find a previous HDL lab    Cannot find a previous total cholesterol lab    Assessment/Plan     Problem List Items Addressed This Visit    None  Visit Diagnoses         Type 2 diabetes mellitus without complication, without long-term current use of insulin            Patient reports no issues with Mounjaro at this time. She reports weight loss is stalled again. Will increase Mounjaro to 12.5 mg dose and will follow up in 4 " weeks.    Type 2 diabetes mellitus, is at goal. <7%    INCREASE Mounjaro to 12.5 mg weekly  Follow up: I recommend diabetes care be 4 weeks.    Felicia Gonzalez, PharmD    Continue all meds under the continuation of care with the referring provider and clinical pharmacy team

## 2025-06-11 ENCOUNTER — APPOINTMENT (OUTPATIENT)
Dept: PHARMACY | Facility: HOSPITAL | Age: 60
End: 2025-06-11
Payer: COMMERCIAL

## 2025-06-11 DIAGNOSIS — E11.9 TYPE 2 DIABETES MELLITUS WITHOUT COMPLICATION, WITHOUT LONG-TERM CURRENT USE OF INSULIN: ICD-10-CM

## 2025-06-11 RX ORDER — TIRZEPATIDE 15 MG/.5ML
15 INJECTION, SOLUTION SUBCUTANEOUS WEEKLY
Qty: 6 ML | Refills: 1 | Status: SHIPPED | OUTPATIENT
Start: 2025-06-11

## 2025-06-11 NOTE — PROGRESS NOTES
"Subjective     Patient ID: Bren Carson is a 60 y.o. female who presents for diabetes management.    HPI    Allergies   Allergen Reactions    Doxycycline GI Upset     Severe GI upset    Amitriptyline Other     Reactions: \"Knocked me out\"    Claritin [Loratadine] Unknown    Crestor [Rosuvastatin] Unknown    Levaquin [Levofloxacin] Unknown    Morphine Unknown    Pantoprazole Unknown    Prilosec [Omeprazole] Unknown    Rybelsus [Semaglutide] Other     Stomach cramping, diarrhea and emesis    Simvastatin Unknown     Objective     Current DM Pharmacotherapy:   - Mounjaro 12.5 mg weekly    SECONDARY PREVENTION  - Statin? No  - ACE-I/ARB? No  - Aspirin? No    Pertinent PMH Review:  - PMH of Pancreatitis: No  - PMH/FH of Medullary Thyroid Cancer: No  - PMH of Retinopathy: No  - PMH of Urinary Tract Infections: No    Lab Review  Lab Results   Component Value Date    BILITOT 0.4 02/26/2024    CALCIUM 9.1 01/10/2025    CO2 35 (H) 01/10/2025    CL 98 01/10/2025    CREATININE 0.65 01/10/2025    GLUCOSE 81 01/10/2025    ALKPHOS 70 02/26/2024    K 3.4 (L) 01/10/2025    PROT 6.8 02/26/2024     01/10/2025    AST 18 02/26/2024    ALT 16 02/26/2024    BUN 18 01/10/2025    ANIONGAP 11 01/10/2025    MG 1.80 11/16/2022    ALBUMIN 4.0 02/26/2024    GFRF >90 03/21/2023     No results found for: \"TRIG\", \"CHOL\", \"LDL\", \"LDLCALC\", \"HDL\"  Lab Results   Component Value Date    HGBA1C 5.6 11/12/2024     The ASCVD Risk score ([place] DK, et al., 2019) failed to calculate for the following reasons:    Cannot find a previous HDL lab    Cannot find a previous total cholesterol lab    Assessment/Plan     Problem List Items Addressed This Visit    None  Visit Diagnoses         Type 2 diabetes mellitus without complication, without long-term current use of insulin            Patient reports no issues with Mounjaro at this time. She reports a 2 lb weight loss. Will increase Mounjaro to 15 mg dose and will follow up in 4 weeks.    Type 2 " diabetes mellitus, is at goal. <7%    INCREASE Mounjaro to 15 mg weekly  Follow up: I recommend diabetes care be 4 weeks.    Felicia Gonzalez, PharmD    Continue all meds under the continuation of care with the referring provider and clinical pharmacy team

## 2025-06-12 ENCOUNTER — TELEPHONE (OUTPATIENT)
Dept: PRIMARY CARE | Facility: CLINIC | Age: 60
End: 2025-06-12
Payer: COMMERCIAL

## 2025-06-12 NOTE — TELEPHONE ENCOUNTER
Patient called in stating that insurance will not cover her Mounjaro and it cost to much out of pocket.  Patient is wondering what the next step is?  Patient can be reached at [phone].        Thank You

## 2025-07-09 ENCOUNTER — APPOINTMENT (OUTPATIENT)
Dept: PHARMACY | Facility: HOSPITAL | Age: 60
End: 2025-07-09
Payer: COMMERCIAL

## 2025-07-09 DIAGNOSIS — E11.9 TYPE 2 DIABETES MELLITUS WITHOUT COMPLICATION, WITHOUT LONG-TERM CURRENT USE OF INSULIN: ICD-10-CM

## 2025-07-09 RX ORDER — TIRZEPATIDE 15 MG/.5ML
15 INJECTION, SOLUTION SUBCUTANEOUS WEEKLY
Qty: 6 ML | Refills: 1 | Status: SHIPPED | OUTPATIENT
Start: 2025-07-09

## 2025-07-09 NOTE — PROGRESS NOTES
"Subjective     Patient ID: Bren Carson is a 60 y.o. female who presents for diabetes management.    HPI    Allergies   Allergen Reactions    Doxycycline GI Upset     Severe GI upset    Amitriptyline Other     Reactions: \"Knocked me out\"    Claritin [Loratadine] Unknown    Crestor [Rosuvastatin] Unknown    Levaquin [Levofloxacin] Unknown    Morphine Unknown    Pantoprazole Unknown    Prilosec [Omeprazole] Unknown    Rybelsus [Semaglutide] Other     Stomach cramping, diarrhea and emesis    Simvastatin Unknown     Objective     Current DM Pharmacotherapy:   - Mounjaro 12.5 mg weekly    SECONDARY PREVENTION  - Statin? No  - ACE-I/ARB? No  - Aspirin? No    Pertinent PMH Review:  - PMH of Pancreatitis: No  - PMH/FH of Medullary Thyroid Cancer: No  - PMH of Retinopathy: No  - PMH of Urinary Tract Infections: No    Lab Review  Lab Results   Component Value Date    BILITOT 0.4 02/26/2024    CALCIUM 9.1 01/10/2025    CO2 35 (H) 01/10/2025    CL 98 01/10/2025    CREATININE 0.65 01/10/2025    GLUCOSE 81 01/10/2025    ALKPHOS 70 02/26/2024    K 3.4 (L) 01/10/2025    PROT 6.8 02/26/2024     01/10/2025    AST 18 02/26/2024    ALT 16 02/26/2024    BUN 18 01/10/2025    ANIONGAP 11 01/10/2025    MG 1.80 11/16/2022    ALBUMIN 4.0 02/26/2024    GFRF >90 03/21/2023     No results found for: \"TRIG\", \"CHOL\", \"LDL\", \"LDLCALC\", \"HDL\"  Lab Results   Component Value Date    HGBA1C 5.6 11/12/2024     The ASCVD Risk score ([place] DK, et al., 2019) failed to calculate for the following reasons:    Cannot find a previous HDL lab    Cannot find a previous total cholesterol lab    Assessment/Plan     Problem List Items Addressed This Visit    None  Visit Diagnoses         Type 2 diabetes mellitus without complication, without long-term current use of insulin        Relevant Medications    tirzepatide (Mounjaro) 15 mg/0.5 mL pen injector        Patient never picked up Mounjaro 15 mg dose. She has been using left over 12.5 mg dose since " our last call. Will resend 15 mg dose and will follow up in 3 weeks.    Type 2 diabetes mellitus, is at goal. <7%    INCREASE Mounjaro to 15 mg weekly  Follow up: I recommend diabetes care be 3 weeks.    Felicia Gonzalez, PharmD    Continue all meds under the continuation of care with the referring provider and clinical pharmacy team

## 2025-07-14 ENCOUNTER — APPOINTMENT (OUTPATIENT)
Dept: PRIMARY CARE | Facility: CLINIC | Age: 60
End: 2025-07-14
Payer: COMMERCIAL

## 2025-07-14 VITALS
WEIGHT: 213 LBS | BODY MASS INDEX: 41.6 KG/M2 | RESPIRATION RATE: 20 BRPM | HEART RATE: 81 BPM | OXYGEN SATURATION: 90 % | DIASTOLIC BLOOD PRESSURE: 72 MMHG | SYSTOLIC BLOOD PRESSURE: 116 MMHG | TEMPERATURE: 98.4 F

## 2025-07-14 DIAGNOSIS — E66.01 MORBID OBESITY WITH BMI OF 40.0-44.9, ADULT (MULTI): ICD-10-CM

## 2025-07-14 DIAGNOSIS — M54.2 NECK PAIN: Primary | ICD-10-CM

## 2025-07-14 DIAGNOSIS — M99.02 THORACIC REGION SOMATIC DYSFUNCTION: ICD-10-CM

## 2025-07-14 PROCEDURE — 99213 OFFICE O/P EST LOW 20 MIN: CPT | Performed by: FAMILY MEDICINE

## 2025-07-14 RX ORDER — PREDNISONE 10 MG/1
TABLET ORAL
Qty: 30 TABLET | Refills: 0 | Status: SHIPPED | OUTPATIENT
Start: 2025-07-14

## 2025-07-14 NOTE — PROGRESS NOTES
Subjective   Patient ID: Bren Carson is a 60 y.o. female who presents for Neck Pain (Has had a stiff neck over 1 week. She did a telehealth appt mon or tues last week and was rx'd a muscle relaxer but gave her jitters. She is still having pain and hears cracking and popping. ).    HPI  Patient comes in today for evaluation of neck and left upper back pain.  She woke up 7/3/2025 with a stiff neck.  She has been using heat, ice and lidocaine rub and nothing seems to be helping.  She is on Celebrex 400 mg a day as well.    Review of Systems   All other systems reviewed and are negative.      Objective   Vitals:  /72   Pulse 81   Temp 36.9 °C (98.4 °F)   Resp 20   Wt 96.6 kg (213 lb)   LMP  (LMP Unknown)   SpO2 90%   BMI 41.60 kg/m²     Physical Exam  Vitals reviewed.   Constitutional:       Appearance: She is morbidly obese.   HENT:      Head: Normocephalic and atraumatic.      Right Ear: Tympanic membrane, ear canal and external ear normal.      Left Ear: Tympanic membrane, ear canal and external ear normal.      Nose: Nose normal.      Mouth/Throat:      Mouth: Mucous membranes are moist.      Pharynx: Oropharynx is clear.   Eyes:      Extraocular Movements: Extraocular movements intact.      Conjunctiva/sclera: Conjunctivae normal.      Pupils: Pupils are equal, round, and reactive to light.   Neck:      Comments: Decreased range of motion of the neck especially with right rotation.  Cardiovascular:      Rate and Rhythm: Normal rate and regular rhythm.      Heart sounds: Normal heart sounds. No murmur heard.  Pulmonary:      Effort: Pulmonary effort is normal.      Breath sounds: Normal breath sounds. No wheezing.   Abdominal:      General: Abdomen is flat. Bowel sounds are normal.      Palpations: Abdomen is soft.   Musculoskeletal:         General: Tenderness (Pain to palpation over the second rib on the left posteriorly.) present. Normal range of motion.   Skin:     General: Skin is warm and  "dry.   Neurological:      General: No focal deficit present.      Mental Status: She is alert and oriented to person, place, and time. Mental status is at baseline.   Psychiatric:         Mood and Affect: Mood normal.         Behavior: Behavior normal.         Thought Content: Thought content normal.         Judgment: Judgment normal.         CBC -  Recent Labs     01/10/25  1018 11/16/22  1004 02/09/21  1529   WBC 7.2 9.7 10.3   HGB 14.7 16.0 14.3   HCT 44.7 50.6* 45.5    323 354   MCV 93 93 97       CMP -  Recent Labs     01/10/25  1018 12/09/24  0910 02/26/24  1715 03/21/23  1613 11/16/22  1004    138 140   < > 140   K 3.4* 3.8 4.2   < > 4.2   CL 98 92* 100   < > 98   CO2 35* 34* 33*   < > 33*   ANIONGAP 11 16 11   < > 13   BUN 18 26* 17   < > 17   CREATININE 0.65 0.83 0.69   < > 0.52   EGFR >90 81 >90   < >  --    MG  --   --   --   --  1.80    < > = values in this interval not displayed.     Recent Labs     02/26/24  1715 12/11/23  1045 03/21/23  1613   ALBUMIN 4.0 3.9 3.8   ALKPHOS 70 59 67   ALT 16 22 18   AST 18 24 16   BILITOT 0.4 1.0 0.3       LIPID PANEL -   No results for input(s): \"CHOL\", \"LDLCALC\", \"LDLF\", \"HDL\", \"TRIG\" in the last 36697 hours.    Recent Labs     11/12/24  1714 12/11/23  1045 03/21/23  1613 11/16/22  1004   BNP  --   --   --  319*   HGBA1C 5.6 7.0* 6.9*  --        Lab Results   Component Value Date    QTUHDOCJ46 654 03/21/2023       Lab Results   Component Value Date    VITD25 44 12/11/2023        Assessment/Plan   Problem List Items Addressed This Visit       Morbid obesity with BMI of 40.0-44.9, adult (Multi)    Overview   Continue Mounjaro weekly          Other Visit Diagnoses         Neck pain    -  Primary    Relevant Medications    predniSONE (Deltasone) 10 mg tablet      Thoracic region somatic dysfunction            Use meds as directed.  Return to clinic if symptoms do not improve in 10-14 days.    Should the condition worsen contact me and return here or if after " hours seek further evaluation at an urgent care or in the emergency room.     Patient exhibiting no signs of depression and does not need treatment at this time.  Medication list reconciled.  Thank you for visiting today!        Professional services: Some of this note was completed using Dragon voice technology and sometimes the software misinterprets words. This may include unintended errors with respect to translation of words, typographical errors or grammar errors which may not have been identified prior to finalization of the chart note. Please take this into account when reading the note. Thank you.              Octavio Garcia DO 07/15/25 6:35 AM

## 2025-07-30 ENCOUNTER — APPOINTMENT (OUTPATIENT)
Dept: PHARMACY | Facility: HOSPITAL | Age: 60
End: 2025-07-30
Payer: COMMERCIAL

## 2025-07-30 DIAGNOSIS — E11.9 TYPE 2 DIABETES MELLITUS WITHOUT COMPLICATION, WITHOUT LONG-TERM CURRENT USE OF INSULIN: ICD-10-CM

## 2025-07-30 RX ORDER — TIRZEPATIDE 15 MG/.5ML
15 INJECTION, SOLUTION SUBCUTANEOUS WEEKLY
Qty: 6 ML | Refills: 1 | Status: SHIPPED | OUTPATIENT
Start: 2025-07-30 | End: 2025-08-01 | Stop reason: SDUPTHER

## 2025-07-30 NOTE — PROGRESS NOTES
"Subjective     Patient ID: Bren Carson is a 60 y.o. female who presents for diabetes management.    HPI    Allergies   Allergen Reactions    Doxycycline GI Upset     Severe GI upset    Amitriptyline Other     Reactions: \"Knocked me out\"    Claritin [Loratadine] Unknown    Crestor [Rosuvastatin] Unknown    Levaquin [Levofloxacin] Unknown    Morphine Unknown    Pantoprazole Unknown    Prilosec [Omeprazole] Unknown    Rybelsus [Semaglutide] Other     Stomach cramping, diarrhea and emesis    Simvastatin Unknown     Objective     Current DM Pharmacotherapy:   - Mounjaro 12.5 mg weekly    SECONDARY PREVENTION  - Statin? No  - ACE-I/ARB? No  - Aspirin? No    Pertinent PMH Review:  - PMH of Pancreatitis: No  - PMH/FH of Medullary Thyroid Cancer: No  - PMH of Retinopathy: No  - PMH of Urinary Tract Infections: No    Lab Review  Lab Results   Component Value Date    BILITOT 0.4 02/26/2024    CALCIUM 9.1 01/10/2025    CO2 35 (H) 01/10/2025    CL 98 01/10/2025    CREATININE 0.65 01/10/2025    GLUCOSE 81 01/10/2025    ALKPHOS 70 02/26/2024    K 3.4 (L) 01/10/2025    PROT 6.8 02/26/2024     01/10/2025    AST 18 02/26/2024    ALT 16 02/26/2024    BUN 18 01/10/2025    ANIONGAP 11 01/10/2025    MG 1.80 11/16/2022    ALBUMIN 4.0 02/26/2024    GFRF >90 03/21/2023     No results found for: \"TRIG\", \"CHOL\", \"LDL\", \"LDLCALC\", \"HDL\"  Lab Results   Component Value Date    HGBA1C 5.6 11/12/2024     The ASCVD Risk score (Bushra DK, et al., 2019) failed to calculate for the following reasons:    Cannot find a previous HDL lab    Cannot find a previous total cholesterol lab    Assessment/Plan     Problem List Items Addressed This Visit    None  Visit Diagnoses         Type 2 diabetes mellitus without complication, without long-term current use of insulin        Relevant Medications    tirzepatide (Mounjaro) 15 mg/0.5 mL pen injector        Patient's insurance is requiring a new PA for Mounjaro. Will submit and will follow up in 1 " week.    Type 2 diabetes mellitus, is at goal. <7%    INCREASE Mounjaro to 15 mg weekly  Follow up: I recommend diabetes care be 1 week.    Felicia Gonzalez, PharmD    Continue all meds under the continuation of care with the referring provider and clinical pharmacy team

## 2025-08-01 RX ORDER — TIRZEPATIDE 15 MG/.5ML
15 INJECTION, SOLUTION SUBCUTANEOUS WEEKLY
Qty: 6 ML | Refills: 1 | Status: SHIPPED | OUTPATIENT
Start: 2025-08-01

## 2025-08-06 ENCOUNTER — APPOINTMENT (OUTPATIENT)
Dept: PHARMACY | Facility: HOSPITAL | Age: 60
End: 2025-08-06
Payer: COMMERCIAL

## 2025-08-06 DIAGNOSIS — E11.9 TYPE 2 DIABETES MELLITUS WITHOUT COMPLICATION, WITHOUT LONG-TERM CURRENT USE OF INSULIN: Primary | ICD-10-CM

## 2025-08-07 NOTE — PROGRESS NOTES
"Subjective     Patient ID: Bren Carson is a 60 y.o. female who presents for diabetes management.    HPI    Allergies   Allergen Reactions    Doxycycline GI Upset     Severe GI upset    Amitriptyline Other     Reactions: \"Knocked me out\"    Claritin [Loratadine] Unknown    Crestor [Rosuvastatin] Unknown    Levaquin [Levofloxacin] Unknown    Morphine Unknown    Pantoprazole Unknown    Prilosec [Omeprazole] Unknown    Rybelsus [Semaglutide] Other     Stomach cramping, diarrhea and emesis    Simvastatin Unknown     Objective     Current DM Pharmacotherapy:   - Mounjaro 15 mg weekly    SECONDARY PREVENTION  - Statin? No  - ACE-I/ARB? No  - Aspirin? No    Pertinent PMH Review:  - PMH of Pancreatitis: No  - PMH/FH of Medullary Thyroid Cancer: No  - PMH of Retinopathy: No  - PMH of Urinary Tract Infections: No    Lab Review  Lab Results   Component Value Date    BILITOT 0.4 02/26/2024    CALCIUM 9.1 01/10/2025    CO2 35 (H) 01/10/2025    CL 98 01/10/2025    CREATININE 0.65 01/10/2025    GLUCOSE 81 01/10/2025    ALKPHOS 70 02/26/2024    K 3.4 (L) 01/10/2025    PROT 6.8 02/26/2024     01/10/2025    AST 18 02/26/2024    ALT 16 02/26/2024    BUN 18 01/10/2025    ANIONGAP 11 01/10/2025    MG 1.80 11/16/2022    ALBUMIN 4.0 02/26/2024    GFRF >90 03/21/2023     No results found for: \"TRIG\", \"CHOL\", \"LDL\", \"LDLCALC\", \"HDL\"  Lab Results   Component Value Date    HGBA1C 5.6 11/12/2024     The ASCVD Risk score (Bushra DK, et al., 2019) failed to calculate for the following reasons:    Cannot find a previous HDL lab    Cannot find a previous total cholesterol lab    Assessment/Plan   PA for Mounjaro was approved. Patient to start 15 mg dose. Will follow up in 4 weeks to review tolerability.    Type 2 diabetes mellitus, is at goal. <7%    INCREASE Mounjaro to 15 mg weekly  Follow up: I recommend diabetes care be 4 weeks.    Felicia Gonzalez, PharmD    Continue all meds under the continuation of care with the referring " provider and clinical pharmacy team

## 2025-08-15 LAB
ANION GAP SERPL CALCULATED.4IONS-SCNC: 11 MMOL/L (CALC) (ref 7–17)
BNP SERPL-MCNC: 194 PG/ML
BUN SERPL-MCNC: 14 MG/DL (ref 7–25)
BUN/CREAT SERPL: NORMAL (CALC) (ref 6–22)
CALCIUM SERPL-MCNC: 9.1 MG/DL (ref 8.6–10.4)
CHLORIDE SERPL-SCNC: 101 MMOL/L (ref 98–110)
CO2 SERPL-SCNC: 31 MMOL/L (ref 20–32)
CREAT SERPL-MCNC: 0.97 MG/DL (ref 0.5–1.05)
CRP SERPL-MCNC: 4.3 MG/L
EGFRCR SERPLBLD CKD-EPI 2021: 67 ML/MIN/1.73M2
ERYTHROCYTE [DISTWIDTH] IN BLOOD BY AUTOMATED COUNT: 13.3 % (ref 11–15)
ERYTHROCYTE [SEDIMENTATION RATE] IN BLOOD BY WESTERGREN METHOD: 11 MM/H
GLUCOSE SERPL-MCNC: 89 MG/DL (ref 65–99)
HCT VFR BLD AUTO: 41.2 % (ref 35–45)
HGB BLD-MCNC: 13.2 G/DL (ref 11.7–15.5)
INR PPP: 1
MCH RBC QN AUTO: 31.9 PG (ref 27–33)
MCHC RBC AUTO-ENTMCNC: 32 G/DL (ref 32–36)
MCV RBC AUTO: 99.5 FL (ref 80–100)
PLATELET # BLD AUTO: 299 THOUSAND/UL (ref 140–400)
PMV BLD REES-ECKER: 10.2 FL (ref 7.5–12.5)
POTASSIUM SERPL-SCNC: 3.6 MMOL/L (ref 3.5–5.3)
PROTHROMBIN TIME: 10.7 SEC (ref 9–11.5)
RBC # BLD AUTO: 4.14 MILLION/UL (ref 3.8–5.1)
SODIUM SERPL-SCNC: 143 MMOL/L (ref 135–146)
WBC # BLD AUTO: 6.5 THOUSAND/UL (ref 3.8–10.8)

## 2025-09-05 ENCOUNTER — APPOINTMENT (OUTPATIENT)
Dept: PHARMACY | Facility: HOSPITAL | Age: 60
End: 2025-09-05
Payer: COMMERCIAL

## (undated) DEVICE — GUIDEWIRE, INQWIRE, 3MM J, .035 X 210CM, FIXED

## (undated) DEVICE — CATHETER, WEDGE PRESSURE, BALLOON, DOUBLE LUMEN, 5 FR, 110 CM

## (undated) DEVICE — CATHETER, OPTITORQUE, 5FR, TIG, 1H/100CM

## (undated) DEVICE — GUIDE WIRE, 260CM, HI-TORQUE, VERSACORE, MODIFIED J

## (undated) DEVICE — INTRODUCER, GLIDESHEATH SLENDER A-KIT, 6FR 10CM

## (undated) DEVICE — INTRODUCER, GLIDESHEATH SLENDER A-KIT, 5FR 10CM

## (undated) DEVICE — Device

## (undated) DEVICE — TR BAND, RADIAL COMPRESSION, STANDARD, 24CM